# Patient Record
Sex: FEMALE | Race: WHITE | NOT HISPANIC OR LATINO
[De-identification: names, ages, dates, MRNs, and addresses within clinical notes are randomized per-mention and may not be internally consistent; named-entity substitution may affect disease eponyms.]

---

## 2018-05-22 ENCOUNTER — APPOINTMENT (OUTPATIENT)
Dept: ORTHOPEDIC SURGERY | Facility: CLINIC | Age: 83
End: 2018-05-22
Payer: MEDICARE

## 2018-05-22 VITALS — DIASTOLIC BLOOD PRESSURE: 91 MMHG | SYSTOLIC BLOOD PRESSURE: 154 MMHG

## 2018-05-22 VITALS
WEIGHT: 152 LBS | BODY MASS INDEX: 25.95 KG/M2 | HEART RATE: 73 BPM | HEIGHT: 64 IN | DIASTOLIC BLOOD PRESSURE: 94 MMHG | SYSTOLIC BLOOD PRESSURE: 173 MMHG

## 2018-05-22 DIAGNOSIS — Z82.62 FAMILY HISTORY OF OSTEOPOROSIS: ICD-10-CM

## 2018-05-22 DIAGNOSIS — Z82.61 FAMILY HISTORY OF ARTHRITIS: ICD-10-CM

## 2018-05-22 DIAGNOSIS — Z78.9 OTHER SPECIFIED HEALTH STATUS: ICD-10-CM

## 2018-05-22 DIAGNOSIS — Z60.2 PROBLEMS RELATED TO LIVING ALONE: ICD-10-CM

## 2018-05-22 DIAGNOSIS — Z80.9 FAMILY HISTORY OF MALIGNANT NEOPLASM, UNSPECIFIED: ICD-10-CM

## 2018-05-22 DIAGNOSIS — M16.12 UNILATERAL PRIMARY OSTEOARTHRITIS, LEFT HIP: ICD-10-CM

## 2018-05-22 DIAGNOSIS — Z86.39 PERSONAL HISTORY OF OTHER ENDOCRINE, NUTRITIONAL AND METABOLIC DISEASE: ICD-10-CM

## 2018-05-22 PROCEDURE — 73502 X-RAY EXAM HIP UNI 2-3 VIEWS: CPT | Mod: LT

## 2018-05-22 PROCEDURE — 99203 OFFICE O/P NEW LOW 30 MIN: CPT

## 2018-05-22 RX ORDER — APIXABAN 5 MG/1
TABLET, FILM COATED ORAL
Refills: 0 | Status: ACTIVE | COMMUNITY

## 2018-05-22 SDOH — SOCIAL STABILITY - SOCIAL INSECURITY: PROBLEMS RELATED TO LIVING ALONE: Z60.2

## 2019-09-25 ENCOUNTER — APPOINTMENT (OUTPATIENT)
Dept: ORTHOPEDIC SURGERY | Facility: CLINIC | Age: 84
End: 2019-09-25
Payer: MEDICARE

## 2019-09-25 VITALS — HEIGHT: 64 IN | WEIGHT: 152 LBS | BODY MASS INDEX: 25.95 KG/M2

## 2019-09-25 VITALS — WEIGHT: 152 LBS | HEIGHT: 64 IN | BODY MASS INDEX: 25.95 KG/M2

## 2019-09-25 PROCEDURE — 73610 X-RAY EXAM OF ANKLE: CPT | Mod: LT

## 2019-09-25 PROCEDURE — 20610 DRAIN/INJ JOINT/BURSA W/O US: CPT | Mod: RT

## 2019-09-25 PROCEDURE — 99204 OFFICE O/P NEW MOD 45 MIN: CPT | Mod: 25

## 2019-09-25 PROCEDURE — 73562 X-RAY EXAM OF KNEE 3: CPT | Mod: RT

## 2019-10-02 PROBLEM — Z60.2 PERSON LIVING ALONE: Status: ACTIVE | Noted: 2018-05-22

## 2019-11-04 ENCOUNTER — APPOINTMENT (OUTPATIENT)
Dept: ORTHOPEDIC SURGERY | Facility: CLINIC | Age: 84
End: 2019-11-04
Payer: MEDICARE

## 2019-11-04 PROCEDURE — 20611 DRAIN/INJ JOINT/BURSA W/US: CPT | Mod: RT

## 2019-11-04 PROCEDURE — 99214 OFFICE O/P EST MOD 30 MIN: CPT | Mod: 25

## 2019-11-20 ENCOUNTER — APPOINTMENT (OUTPATIENT)
Dept: ORTHOPEDIC SURGERY | Facility: CLINIC | Age: 84
End: 2019-11-20

## 2019-12-16 ENCOUNTER — APPOINTMENT (OUTPATIENT)
Dept: ORTHOPEDIC SURGERY | Facility: CLINIC | Age: 84
End: 2019-12-16
Payer: MEDICARE

## 2019-12-16 VITALS — HEART RATE: 72 BPM | SYSTOLIC BLOOD PRESSURE: 139 MMHG | DIASTOLIC BLOOD PRESSURE: 88 MMHG

## 2019-12-16 VITALS — WEIGHT: 152 LBS | HEIGHT: 64 IN | BODY MASS INDEX: 25.95 KG/M2

## 2019-12-16 PROCEDURE — 99213 OFFICE O/P EST LOW 20 MIN: CPT | Mod: 25

## 2019-12-16 PROCEDURE — 20605 DRAIN/INJ JOINT/BURSA W/O US: CPT | Mod: LT

## 2019-12-16 NOTE — ADDENDUM
[FreeTextEntry1] : I, Natalya Santos, acted solely as a scribe for Dr. Les Mcrae on this date 12/16/2019 \par All medical record entries made by the Scribe were at my, Dr. Les Mcrae, direction and personally dictated by me on 12/16/2019 . I have reviewed the chart and agree that the record accurately reflects my personal performance of the history, physical exam, assessment and plan. I have also personally directed, reviewed, and agreed with the chart.

## 2019-12-16 NOTE — DISCUSSION/SUMMARY
[de-identified] : I discussed the underlying pathophysiology of the patient's condition in great detail with the patient. I went over the patient's x-rays with them in great detail.  I gave the cortisone injection into the patient's left ankle, and she tolerated it well. I told her  to take it easy for the rest of the day, and to continue ROM exercises. The use of ice and rest was also reviewed with the patient. \par \par FU 1 month

## 2019-12-16 NOTE — HISTORY OF PRESENT ILLNESS
[de-identified] : 89 year old female presents with left ankle pain. She had a Durolane injection into her right knee on 11/4/19, which provided her with significant relief. Her right knee does not cause her any pain at this point. She states that she notices her left ankle becomes swollen, and hurts especially after prolonged activity. She localizes this pain to the lateral aspect of her left ankle. 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

## 2019-12-16 NOTE — PHYSICAL EXAM
[de-identified] : Constitutional\par o Appearance : well-nourished, well developed, alert, in no acute distress \par Head and Face\par o Head :\par ¦ Inspection : atraumatic, normocephalic\par o Face :\par ¦ Inspection : no visible rash or discoloration\par Lymphatic\par o Epitrochlear Nodes : no lymphadenopathy \par o Popliteal Nodes : no palpable nodes present \par o Supraclavicular Nodes : no supraclavicular nodes present \par o Preauricular Nodes : no preauricular nodes present \par o Additional Nodes : no additional adenopathy present Skin and Subcutaneous Tissue \par o Head and Neck :\par ¦ Face : no facial lesions\par Neurologic\par o Mental Status Examination :\par ¦ Orientation : alert and oriented X 3\par o Coordination : finger-to-nose-to-finger testing normal bilaterally, heel-shin cerebellar test within normal limits bilaterally \par Psychiatric\par o Mood and Affect: mood normal, affect appropriate \par \par Right Lower Extremity\par o Right Knee :\par ¦ Inspection/Palpation : no medial compartment or lateral compartment tenderness, swelling, or deformities, no warmth\par ¦ Range of Motion : FROM, no crepitance\par ¦ Stability : no valgus or varus instability present on provocative testing\par ¦ Strength : flexion and extension 5/5\par ¦ Tests and Signs : negative Anterior Drawer, negative Lachman, negative Julianna \par \par o Right Ankle :\par ¦ Inspection/Palpation : no tenderness to palpation, no swelling    \par ¦ Range of Motion : arc of motion full and painless in all planes\par ¦ Stability : no joint instability en provocative testing\par ¦ Strength : all muscles 5/5\par o Skin : no erythema or ecchymosis present\par o Sensation : sensation to pin intact\par Tests and Signs : Salvador's test negative\par \par o Right Foot:\par ¦ Inspection/Palpation : no tenderness to palpation, no swelling\par ¦ Range of Motion : arc of motion full and painless in all planes\par ¦ Stability : no joint instability on provocative testing\par ¦ Strength : all muscles 5/5\par o Skin : no erythema or ecchymosis present\par \par Left Lower Extremity \par o Left Knee :\par ¦ Inspection/Palpation : no tenderness to palpation, no swelling\par ¦ Range of Motion : active flexion and extension full and painless, no crepitance\par ¦ Stability : no valgus or varus instability present on provocative testing\par ¦ Strength : flexion and extension 5/5\par ¦ Tests and Signs : negative Anterior Drawer, negative Lachman, negative Julianna \par \par o Left Ankle :\par ¦ Inspection/Palpation : medial and lateral clear space tenderness, subtalar joint tenderness\par ¦ Range of Motion : limited eversion and inversion\par ¦ Stability : no joint instability en provocative testing\par ¦ Strength : all muscles 5/5\par o Skin : no erythema or ecchymosis present\par o Sensation : sensation to pin intact\par Tests and Signs : Salvador's test negative\par \par o Left Foot:\par ¦ Inspection/Palpation : no tenderness, severe planovalgus deformity.\par ¦ Range of Motion : arc of motion full and painless in all planes\par ¦ Stability : no joint instability on provocative testing\par ¦ Strength : all muscles 5/5\par o Skin : no erythema or ecchymosis present\par \par Dorsalis Pedis / Posterior Tibialis Pulse Bilaterally: 2+\par \par Gait and Station:\par Gait: Slow shuffling gait, no significant extremity swelling or lymphedema, poor proprioception and balance, planovalgus deformity of the left foot\par \par o Ankle injection : Indication- ankle osteoarthritis, Anatomic location- left ankle joint space, Spray - area was sterilized with Betadine and alcohol and anesthetized with Ethyl Chloride , needle used-20G, Medications given- 5cc's lidocaine, 0.5cc's kenalog, 0.5 cc's dexamethasone, and patient tolerated it well.

## 2020-01-29 ENCOUNTER — APPOINTMENT (OUTPATIENT)
Dept: ORTHOPEDIC SURGERY | Facility: CLINIC | Age: 85
End: 2020-01-29
Payer: MEDICARE

## 2020-01-29 PROCEDURE — 99214 OFFICE O/P EST MOD 30 MIN: CPT

## 2020-05-29 ENCOUNTER — APPOINTMENT (OUTPATIENT)
Dept: ORTHOPEDIC SURGERY | Facility: CLINIC | Age: 85
End: 2020-05-29
Payer: MEDICARE

## 2020-05-29 ENCOUNTER — APPOINTMENT (OUTPATIENT)
Dept: ORTHOPEDIC SURGERY | Facility: CLINIC | Age: 85
End: 2020-05-29

## 2020-05-29 VITALS — HEIGHT: 64 IN | BODY MASS INDEX: 25.95 KG/M2 | WEIGHT: 152 LBS

## 2020-05-29 DIAGNOSIS — M17.11 UNILATERAL PRIMARY OSTEOARTHRITIS, RIGHT KNEE: ICD-10-CM

## 2020-05-29 DIAGNOSIS — Q66.6 OTHER CONGENITAL VALGUS DEFORMITIES OF FEET: ICD-10-CM

## 2020-05-29 PROCEDURE — 20605 DRAIN/INJ JOINT/BURSA W/O US: CPT | Mod: LT

## 2020-05-29 PROCEDURE — 99214 OFFICE O/P EST MOD 30 MIN: CPT | Mod: 25

## 2020-05-29 PROCEDURE — 73610 X-RAY EXAM OF ANKLE: CPT | Mod: LT

## 2020-05-29 NOTE — ADDENDUM
[FreeTextEntry1] : I, Moris Moeller , acted solely as a scribe for Dr. Les Mcrae on this date 05/29/2020.\par All medical record entries made by the Scribe were at my, Dr. Les Mcrae, direction and personally dictated by me on 05/29/2020. I have reviewed the chart and agree that the record accurately reflects my personal performance of the history, physical exam, assessment and plan. I have also personally directed, reviewed, and agreed with the chart.

## 2020-05-29 NOTE — HISTORY OF PRESENT ILLNESS
[de-identified] : 89 year old female presents with left ankle pain. She had a Durolane injection into her right knee on 11/4/19, which provided her with significant relief. Her right knee does not cause her any pain at this point. She states that she notices her left ankle becomes swollen, and hurts especially after prolonged activity. She localizes this pain to the lateral aspect of her left ankle. She received a cortisone injection into the left ankle on 12/16/2020 and reports moderate relief. She is aware that she has significant arthritis in her ankle. She was prescribed orthotics and has been wearing them. She notes no pain in her right knee today. She is present for a left ankle cortisone injection today 05/29/2020. Pmhx: She is on Eliquis.  She has not gotten her orthotics.  Her knee is great at this point and is not a problem.

## 2020-05-29 NOTE — PHYSICAL EXAM
[de-identified] : Constitutional\par o Appearance : well-nourished, well developed, alert, in no acute distress \par Head and Face\par o Head :\par ¦ Inspection : atraumatic, normocephalic\par o Face :\par ¦ Inspection : no visible rash or discoloration\par Lymphatic\par o Epitrochlear Nodes : no lymphadenopathy \par o Popliteal Nodes : no palpable nodes present \par o Supraclavicular Nodes : no supraclavicular nodes present \par o Preauricular Nodes : no preauricular nodes present \par o Additional Nodes : no additional adenopathy present Skin and Subcutaneous Tissue \par o Head and Neck :\par ¦ Face : no facial lesions\par Neurologic\par o Mental Status Examination :\par ¦ Orientation : alert and oriented X 3\par o Coordination : finger-to-nose-to-finger testing normal bilaterally, heel-shin cerebellar test within normal limits bilaterally \par Psychiatric\par o Mood and Affect: mood normal, affect appropriate \par \par Right Lower Extremity\par o Right Ankle :\par ¦ Inspection/Palpation : no tenderness to palpation, no swelling    \par ¦ Range of Motion : arc of motion full and painless in all planes\par ¦ Stability : no joint instability en provocative testing\par ¦ Strength : all muscles 5/5\par o Skin : no erythema or ecchymosis present\par o Sensation : sensation to pin intact\par Tests and Signs : Salvador's test negative\par \par o Right Foot:\par ¦ Inspection/Palpation : no tenderness to palpation, no swelling\par ¦ Range of Motion : arc of motion full and painless in all planes\par ¦ Stability : no joint instability on provocative testing\par ¦ Strength : all muscles 5/5\par o Skin : no erythema or ecchymosis present\par \par Left Lower Extremity \par o Left Ankle :\par ¦ Inspection/Palpation : posterior tibial  tenderness,  peroneal tenderness, medial clear space tenderness. Most sensitive over the Peroneal tenderness. \par ¦ Range of Motion : limited plantar flexion, and eversion, \par ¦ Stability : no joint instability en provocative testing\par ¦ Strength : all muscles 4/5\par o Skin : no erythema or ecchymosis present\par o Sensation : sensation to pin intact\par Tests and Signs : Salvador's test negative\par \par o Left Foot:\par ¦ Inspection/Palpation : peroneal  tenderness, severe planovalgus deformity.\par ¦ Range of Motion : arc of motion full and painless in all planes\par ¦ Stability : no joint instability on provocative testing\par ¦ Strength : all muscles 4/5\par o Skin : no erythema or ecchymosis present\par \par Dorsalis Pedis / Posterior Tibialis Pulse Bilaterally: 2+\par \par Gait and Station:\par Gait: Slow shuffling gait, no significant extremity swelling or lymphedema, poor proprioception and balance, planovalgus deformity of the left foot, too many toes sign. \par \par Radiology Results \par o Left Ankle : AP, lateral and mortise views were obtained and reveal moderate arthritis of the left ankle, with mid foot arthritis, and significant subtalar arthritis. \par \par o Left Ankle injection : Indication- ankle osteoarthritis, Anatomic location- left peroneal tendons, Spray - area was sterilized with Betadine and alcohol and anesthetized with Ethyl Chloride , needle used-25g, Medications given- 1 cc's lidocaine, 0.5cc's kenalog, 0.5 cc's dexamethasone, and patient tolerated it well.  [id number]

## 2020-05-29 NOTE — PHYSICAL EXAM
[de-identified] : Constitutional\par o Appearance : well-nourished, well developed, alert, in no acute distress \par Head and Face\par o Head :\par ¦ Inspection : atraumatic, normocephalic\par o Face :\par ¦ Inspection : no visible rash or discoloration\par Lymphatic\par o Epitrochlear Nodes : no lymphadenopathy \par o Popliteal Nodes : no palpable nodes present \par o Supraclavicular Nodes : no supraclavicular nodes present \par o Preauricular Nodes : no preauricular nodes present \par o Additional Nodes : no additional adenopathy present Skin and Subcutaneous Tissue \par o Head and Neck :\par ¦ Face : no facial lesions\par Neurologic\par o Mental Status Examination :\par ¦ Orientation : alert and oriented X 3\par o Coordination : finger-to-nose-to-finger testing normal bilaterally, heel-shin cerebellar test within normal limits bilaterally \par Psychiatric\par o Mood and Affect: mood normal, affect appropriate \par \par Right Lower Extremity\par o Right Knee :\par ¦ Inspection/Palpation : mild medial compartment tenderness, no swelling, or deformities, no warmth\par ¦ Range of Motion : FROM, no crepitance\par ¦ Stability : no valgus or varus instability present on provocative testing\par ¦ Strength : flexion and extension 4+/5\par ¦ Tests and Signs : negative Anterior Drawer, negative Lachman, negative Julianna \par \par o Right Ankle :\par ¦ Inspection/Palpation : no tenderness to palpation, no swelling    \par ¦ Range of Motion : arc of motion full and painless in all planes\par ¦ Stability : no joint instability en provocative testing\par ¦ Strength : all muscles 5/5\par o Skin : no erythema or ecchymosis present\par o Sensation : sensation to pin intact\par Tests and Signs : Salvador's test negative\par \par o Right Foot:\par ¦ Inspection/Palpation : no tenderness to palpation, no swelling\par ¦ Range of Motion : arc of motion full and painless in all planes\par ¦ Stability : no joint instability on provocative testing\par ¦ Strength : all muscles 5/5\par o Skin : no erythema or ecchymosis present\par \par Left Lower Extremity \par o Left Knee :\par ¦ Inspection/Palpation : no tenderness to palpation, no swelling\par ¦ Range of Motion : active flexion and extension full and painless, no crepitance\par ¦ Stability : no valgus or varus instability present on provocative testing\par ¦ Strength : flexion and extension 4/5\par ¦ Tests and Signs : negative Anterior Drawer, negative Lachman, negative Julianna \par \par o Left Ankle :\par ¦ Inspection/Palpation : lateral clear space tenderness,  peroneal tenderness \par ¦ Range of Motion : Limited inversion / eversion\par ¦ Stability : no joint instability en provocative testing\par ¦ Strength : all muscles 4/5\par o Skin : no erythema or ecchymosis present\par o Sensation : sensation to pin intact\par Tests and Signs : Salvador's test negative\par \par o Left Foot:\par ¦ Inspection/Palpation : peroneal  tenderness, severe planovalgus deformity.\par ¦ Range of Motion : arc of motion full and painless in all planes\par ¦ Stability : no joint instability on provocative testing\par ¦ Strength : all muscles 4/5\par o Skin : no erythema or ecchymosis present\par \par Dorsalis Pedis / Posterior Tibialis Pulse Bilaterally: 2+\par \par Gait and Station:\par Gait: Slow shuffling gait, no significant extremity swelling or lymphedema, poor proprioception and balance, planovalgus deformity of the left foot

## 2020-05-29 NOTE — DISCUSSION/SUMMARY
[de-identified] : I went over the pathophysiology of the patient's symptoms in great detail and used models to aid in my explanation.

## 2020-05-29 NOTE — DISCUSSION/SUMMARY
[de-identified] : I discussed the underlying pathophysiology of the patient's condition in great detail with the patient. I went over the patient's x-rays with them in great detail. The extent of the patient’s arthritis was discussed in great detail with them.  We discussed the use of ice, Tylenol relieve pain. The patient was instructed in ROM exercises they are to do at home. At-home strengthening, and stretching exercises were discussed and demonstrated with the patient. We went over the wide ranging benefits of exercise for the patient health and I encouraged her to begin a diligent exercise program. She needs to avoid high-impact activities such as running and jumping. I recommend alternative activities such as riding a stationary bike on low tension, or the elliptical. She should focus on light weight and high repetition exercises. She  should avoid squatting, and kneeling.  I discussed that the patient should get orthotics for her shoes. A prescription for orthotics was provided to the patient.  I gave the cortisone injection into the patient's left ankle, and she tolerated it well. I told her  to take it easy for the rest of the day, and to continue ROM exercises. The use of ice and rest was also reviewed with the patient.  At this time, she will start a course of physical therapy for strengthening and flexibility. A prescription for physical therapy was provided. All of her questions were answered. She understands and consents to the plan. \par \par FU 1 month\par after undergoing a left ankle cortisone injection today 05/29/2020. \par after wearing orthotics. \par after doing PT.

## 2020-05-29 NOTE — HISTORY OF PRESENT ILLNESS
[de-identified] : 89  year old female presents with left ankle pain. She had a Durolane injection into her right knee on 11/4/19, which provided her with significant relief. Her right knee does not cause her any pain at this point. She states that she notices her left ankle becomes swollen, and hurts especially after prolonged activity. She localizes this pain to the lateral aspect of her left ankle. She received a cortisone injection into the left ankle on 12/16/2020 and reports moderate relief. She is aware that she has significant arthritis in her ankle. 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

## 2020-06-25 ENCOUNTER — APPOINTMENT (OUTPATIENT)
Dept: ORTHOPEDIC SURGERY | Facility: CLINIC | Age: 85
End: 2020-06-25

## 2021-01-19 ENCOUNTER — APPOINTMENT (OUTPATIENT)
Dept: PULMONOLOGY | Facility: CLINIC | Age: 86
End: 2021-01-19
Payer: MEDICARE

## 2021-01-19 ENCOUNTER — FORM ENCOUNTER (OUTPATIENT)
Age: 86
End: 2021-01-19

## 2021-01-19 DIAGNOSIS — U07.1 COVID-19: ICD-10-CM

## 2021-01-19 PROCEDURE — 99443: CPT | Mod: CS,95

## 2021-01-19 NOTE — HISTORY OF PRESENT ILLNESS
[Home] : at home, [unfilled] , at the time of the visit. [Medical Office: ([facility])___] : at the medical office located in  [Verbal consent obtained from patient] : the patient, [unfilled] [FreeTextEntry1] : Initial Telephonic visit - COVID. \par [facility] Referral. Pt of Dr. lCeaning\par \par 89 year old woman. Independent. Lives at [facility], alone\par \par h/o AF - on Eliquis.\par h/o bronchitis that develops with URI\par \par Sx developed 1/14.\par Initially mild. cough, "cold" sx\par Went to ED at [facility] 1/17 - tested positive for COVID, and tx with MAB. Released\par CXR there negative\par last 2 days, fatigue, in bed, sleeping.\par Today -- feeling a bit stronger.\par Still sleeping a lot\par Decreased appetite . but is eating some. Taking in fluids\par Has a pulse ox - 99%.\par Doesnt have a thermometer.\par \par Dr. Cleaning sent script for pulmicort -- pharmacy to send up to her.\par \par on the phone, sounds tired but no distress\par \par -ordering [facility]\par -will followclosely

## 2021-01-20 ENCOUNTER — APPOINTMENT (OUTPATIENT)
Dept: PULMONOLOGY | Facility: CLINIC | Age: 86
End: 2021-01-20

## 2021-01-20 ENCOUNTER — LABORATORY RESULT (OUTPATIENT)
Age: 86
End: 2021-01-20

## 2021-01-20 ENCOUNTER — APPOINTMENT (OUTPATIENT)
Dept: PULMONOLOGY | Facility: CLINIC | Age: 86
End: 2021-01-20
Payer: MEDICARE

## 2021-01-20 ENCOUNTER — NON-APPOINTMENT (OUTPATIENT)
Age: 86
End: 2021-01-20

## 2021-01-20 LAB
ALBUMIN SERPL ELPH-MCNC: 4 G/DL
ALP BLD-CCNC: 44 U/L
ALT SERPL-CCNC: 15 U/L
ANION GAP SERPL CALC-SCNC: 14 MMOL/L
AST SERPL-CCNC: 22 U/L
BASOPHILS # BLD AUTO: 0.01 K/UL
BASOPHILS NFR BLD AUTO: 0.3 %
BILIRUB SERPL-MCNC: 0.6 MG/DL
BUN SERPL-MCNC: 15 MG/DL
CALCIUM SERPL-MCNC: 9.9 MG/DL
CHLORIDE SERPL-SCNC: 102 MMOL/L
CO2 SERPL-SCNC: 23 MMOL/L
CREAT SERPL-MCNC: 0.85 MG/DL
CRP SERPL-MCNC: 0.44 MG/DL
DEPRECATED D DIMER PPP IA-ACNC: 157 NG/ML DDU
EOSINOPHIL # BLD AUTO: 0.04 K/UL
EOSINOPHIL NFR BLD AUTO: 1.4 %
FERRITIN SERPL-MCNC: 135 NG/ML
GLUCOSE SERPL-MCNC: 103 MG/DL
HCT VFR BLD CALC: 39 %
HGB BLD-MCNC: 12.5 G/DL
IMM GRANULOCYTES NFR BLD AUTO: 0.3 %
LYMPHOCYTES # BLD AUTO: 1.21 K/UL
LYMPHOCYTES NFR BLD AUTO: 42 %
MAN DIFF?: NORMAL
MCHC RBC-ENTMCNC: 32.1 GM/DL
MCHC RBC-ENTMCNC: 32.3 PG
MCV RBC AUTO: 100.8 FL
MONOCYTES # BLD AUTO: 0.41 K/UL
MONOCYTES NFR BLD AUTO: 14.2 %
NEUTROPHILS # BLD AUTO: 1.2 K/UL
NEUTROPHILS NFR BLD AUTO: 41.8 %
PLATELET # BLD AUTO: 139 K/UL
POTASSIUM SERPL-SCNC: 4.9 MMOL/L
PROCALCITONIN SERPL-MCNC: 0.07 NG/ML
PROT SERPL-MCNC: 6.5 G/DL
RBC # BLD: 3.87 M/UL
RBC # FLD: 13.3 %
SODIUM SERPL-SCNC: 139 MMOL/L
WBC # FLD AUTO: 2.88 K/UL

## 2021-01-20 PROCEDURE — 99441: CPT | Mod: CS,95

## 2021-01-20 NOTE — HISTORY OF PRESENT ILLNESS
[Home] : at home, [unfilled] , at the time of the visit. [Verbal consent obtained from patient] : the patient, [unfilled] [Medical Office: ([facility])___] : at the medical office located in  [FreeTextEntry1] : Telephonic follow-up with the patient.  I also spoke with the visiting nurse who saw her in her home today.\par \par Afebrile, pulse ox is 98% on room air.  Still with some cough, but no shortness of breath.  Appetite is continues lungs were clear. \par \par Taking pulmicort prn robitussin\par \par RN to follow up as well\par \par labs ok\par \par d/w dr jeronimo

## 2021-10-05 ENCOUNTER — APPOINTMENT (OUTPATIENT)
Dept: ORTHOPEDIC SURGERY | Facility: CLINIC | Age: 86
End: 2021-10-05
Payer: MEDICARE

## 2021-10-05 VITALS
SYSTOLIC BLOOD PRESSURE: 165 MMHG | HEART RATE: 88 BPM | BODY MASS INDEX: 25.95 KG/M2 | HEIGHT: 64 IN | DIASTOLIC BLOOD PRESSURE: 87 MMHG | WEIGHT: 152 LBS

## 2021-10-05 DIAGNOSIS — Z87.891 PERSONAL HISTORY OF NICOTINE DEPENDENCE: ICD-10-CM

## 2021-10-05 DIAGNOSIS — M54.50 LOW BACK PAIN, UNSPECIFIED: ICD-10-CM

## 2021-10-05 DIAGNOSIS — M47.816 SPONDYLOSIS W/OUT MYELOPATHY OR RADICULOPATHY, LUMBAR REGION: ICD-10-CM

## 2021-10-05 PROCEDURE — 72100 X-RAY EXAM L-S SPINE 2/3 VWS: CPT

## 2021-10-05 PROCEDURE — 99214 OFFICE O/P EST MOD 30 MIN: CPT

## 2021-10-05 NOTE — DISCUSSION/SUMMARY
[Medication Risks Reviewed] : Medication risks reviewed [de-identified] : She will rest and use moist heat.  She will push Tylenol to the full dose.  She has been given written instructions regarding that.  She will call if the symptoms worsen and I will see her for follow-up in 2 to 3 weeks on a as needed basis.

## 2021-10-05 NOTE — HISTORY OF PRESENT ILLNESS
[de-identified] : 71-year-old has a long history of intermittent back problems.  Yesterday she had the sudden acute onset of right-sided lower back pain that extends to high in the right buttock.  She has not had leg pain or neurologic symptoms.  There is no Valsalva effect.  There is no history of injury.  Surprisingly today she already feels 50 or 60% better.\par \par She has had prior surgery for endometrial cancer and breast cancer.  She has had a prior hip replacement and ankle surgery.  She is on medication for hypertension and hyperlipidemia.  She is also on Eliquis but cannot tell me why she is on the Eliquis.  She denies a history of atrial fibrillation, TIA, a stroke or thromboembolic disease. [Pain Location] : pain [5] : a maximum pain level of 5/10

## 2021-10-05 NOTE — PHYSICAL EXAM
[de-identified] : She seems fully alert and oriented with a normal mood and affect.  She cannot accurately delineate all of her medical history.  She has some difficulty arising from a chair due to right-sided lower back pain.  She ambulates with a slow gait.  She has a mildly increased kyphosis and a mild scoliosis.  There are no cutaneous abnormalities or palpable bony defects of the spine.  There is no evidence of shortness of breath or respiratory distress.  There is no paravertebral muscle spasm but sidebending is limited.  There is mild right-sided sciatic notch sensitivity but none on the left.  There is no trochanteric tenderness.  Forward flexion of the spine at 40 or 50 degrees causes mild right-sided lower back pain.  Her lower extremity neurological examination revealed 1+ symmetrical reflexes.  Motor power is normal to manual testing in all lower extremity groups and sensation is normal to light touch in all dermatomes.  Straight leg raising is negative to 90 degrees in the sitting position.  Vascular examination reveals superficial varicosities.  There is no lymphedema.  Her upper extremities are normal to inspection and her elbows have a full and painless range of motion with normal motor power normal stability. [de-identified] : AP and lateral x-rays of the lumbar spine reveal mild scoliosis.  There is significant multilevel degenerative changes.  Sagittal alignment is normal.  There is an old appearing superior endplate deformity of L2.  There are no destructive changes.

## 2021-10-09 ENCOUNTER — APPOINTMENT (OUTPATIENT)
Dept: OBGYN | Facility: CLINIC | Age: 86
End: 2021-10-09

## 2022-01-03 ENCOUNTER — EMERGENCY (EMERGENCY)
Facility: HOSPITAL | Age: 87
LOS: 1 days | Discharge: HOME | End: 2022-01-03
Attending: EMERGENCY MEDICINE | Admitting: EMERGENCY MEDICINE
Payer: MEDICARE

## 2022-01-03 VITALS
SYSTOLIC BLOOD PRESSURE: 172 MMHG | DIASTOLIC BLOOD PRESSURE: 86 MMHG | HEART RATE: 83 BPM | RESPIRATION RATE: 20 BRPM | HEIGHT: 64 IN | OXYGEN SATURATION: 97 % | TEMPERATURE: 98 F

## 2022-01-03 LAB
ALBUMIN SERPL ELPH-MCNC: 4.6 G/DL — SIGNIFICANT CHANGE UP (ref 3.3–5)
ALP SERPL-CCNC: 58 U/L — SIGNIFICANT CHANGE UP (ref 40–120)
ALT FLD-CCNC: 14 U/L — SIGNIFICANT CHANGE UP (ref 4–33)
ANION GAP SERPL CALC-SCNC: 13 MMOL/L — SIGNIFICANT CHANGE UP (ref 7–14)
APTT BLD: 33.6 SEC — SIGNIFICANT CHANGE UP (ref 27–36.3)
AST SERPL-CCNC: 17 U/L — SIGNIFICANT CHANGE UP (ref 4–32)
B PERT DNA SPEC QL NAA+PROBE: SIGNIFICANT CHANGE UP
B PERT+PARAPERT DNA PNL SPEC NAA+PROBE: SIGNIFICANT CHANGE UP
BASOPHILS # BLD AUTO: 0.01 K/UL — SIGNIFICANT CHANGE UP (ref 0–0.2)
BASOPHILS NFR BLD AUTO: 0.1 % — SIGNIFICANT CHANGE UP (ref 0–2)
BILIRUB SERPL-MCNC: 1.7 MG/DL — HIGH (ref 0.2–1.2)
BLD GP AB SCN SERPL QL: NEGATIVE — SIGNIFICANT CHANGE UP
BLOOD GAS VENOUS COMPREHENSIVE RESULT: SIGNIFICANT CHANGE UP
BORDETELLA PARAPERTUSSIS (RAPRVP): SIGNIFICANT CHANGE UP
BUN SERPL-MCNC: 16 MG/DL — SIGNIFICANT CHANGE UP (ref 7–23)
C PNEUM DNA SPEC QL NAA+PROBE: SIGNIFICANT CHANGE UP
CALCIUM SERPL-MCNC: 10.2 MG/DL — SIGNIFICANT CHANGE UP (ref 8.4–10.5)
CHLORIDE SERPL-SCNC: 100 MMOL/L — SIGNIFICANT CHANGE UP (ref 98–107)
CO2 SERPL-SCNC: 25 MMOL/L — SIGNIFICANT CHANGE UP (ref 22–31)
CREAT SERPL-MCNC: 0.66 MG/DL — SIGNIFICANT CHANGE UP (ref 0.5–1.3)
EOSINOPHIL # BLD AUTO: 0 K/UL — SIGNIFICANT CHANGE UP (ref 0–0.5)
EOSINOPHIL NFR BLD AUTO: 0 % — SIGNIFICANT CHANGE UP (ref 0–6)
FLUAV SUBTYP SPEC NAA+PROBE: SIGNIFICANT CHANGE UP
FLUBV RNA SPEC QL NAA+PROBE: SIGNIFICANT CHANGE UP
GLUCOSE SERPL-MCNC: 175 MG/DL — HIGH (ref 70–99)
HADV DNA SPEC QL NAA+PROBE: SIGNIFICANT CHANGE UP
HCOV 229E RNA SPEC QL NAA+PROBE: SIGNIFICANT CHANGE UP
HCOV HKU1 RNA SPEC QL NAA+PROBE: SIGNIFICANT CHANGE UP
HCOV NL63 RNA SPEC QL NAA+PROBE: SIGNIFICANT CHANGE UP
HCOV OC43 RNA SPEC QL NAA+PROBE: SIGNIFICANT CHANGE UP
HCT VFR BLD CALC: 41.9 % — SIGNIFICANT CHANGE UP (ref 34.5–45)
HGB BLD-MCNC: 13.4 G/DL — SIGNIFICANT CHANGE UP (ref 11.5–15.5)
HMPV RNA SPEC QL NAA+PROBE: SIGNIFICANT CHANGE UP
HPIV1 RNA SPEC QL NAA+PROBE: SIGNIFICANT CHANGE UP
HPIV2 RNA SPEC QL NAA+PROBE: SIGNIFICANT CHANGE UP
HPIV3 RNA SPEC QL NAA+PROBE: SIGNIFICANT CHANGE UP
HPIV4 RNA SPEC QL NAA+PROBE: SIGNIFICANT CHANGE UP
IANC: 6.31 K/UL — SIGNIFICANT CHANGE UP (ref 1.5–8.5)
IMM GRANULOCYTES NFR BLD AUTO: 0.3 % — SIGNIFICANT CHANGE UP (ref 0–1.5)
INR BLD: 1.72 RATIO — HIGH (ref 0.88–1.16)
LIDOCAIN IGE QN: 29 U/L — SIGNIFICANT CHANGE UP (ref 7–60)
LYMPHOCYTES # BLD AUTO: 0.55 K/UL — LOW (ref 1–3.3)
LYMPHOCYTES # BLD AUTO: 7.6 % — LOW (ref 13–44)
M PNEUMO DNA SPEC QL NAA+PROBE: SIGNIFICANT CHANGE UP
MCHC RBC-ENTMCNC: 32 GM/DL — SIGNIFICANT CHANGE UP (ref 32–36)
MCHC RBC-ENTMCNC: 32.2 PG — SIGNIFICANT CHANGE UP (ref 27–34)
MCV RBC AUTO: 100.7 FL — HIGH (ref 80–100)
MONOCYTES # BLD AUTO: 0.33 K/UL — SIGNIFICANT CHANGE UP (ref 0–0.9)
MONOCYTES NFR BLD AUTO: 4.6 % — SIGNIFICANT CHANGE UP (ref 2–14)
NEUTROPHILS # BLD AUTO: 6.31 K/UL — SIGNIFICANT CHANGE UP (ref 1.8–7.4)
NEUTROPHILS NFR BLD AUTO: 87.4 % — HIGH (ref 43–77)
NRBC # BLD: 0 /100 WBCS — SIGNIFICANT CHANGE UP
NRBC # FLD: 0 K/UL — SIGNIFICANT CHANGE UP
PLATELET # BLD AUTO: 214 K/UL — SIGNIFICANT CHANGE UP (ref 150–400)
POTASSIUM SERPL-MCNC: 3.9 MMOL/L — SIGNIFICANT CHANGE UP (ref 3.5–5.3)
POTASSIUM SERPL-SCNC: 3.9 MMOL/L — SIGNIFICANT CHANGE UP (ref 3.5–5.3)
PROT SERPL-MCNC: 7.7 G/DL — SIGNIFICANT CHANGE UP (ref 6–8.3)
PROTHROM AB SERPL-ACNC: 19.1 SEC — HIGH (ref 10.6–13.6)
RAPID RVP RESULT: SIGNIFICANT CHANGE UP
RBC # BLD: 4.16 M/UL — SIGNIFICANT CHANGE UP (ref 3.8–5.2)
RBC # FLD: 14.6 % — HIGH (ref 10.3–14.5)
RH IG SCN BLD-IMP: POSITIVE — SIGNIFICANT CHANGE UP
RSV RNA SPEC QL NAA+PROBE: SIGNIFICANT CHANGE UP
RV+EV RNA SPEC QL NAA+PROBE: SIGNIFICANT CHANGE UP
SARS-COV-2 RNA SPEC QL NAA+PROBE: SIGNIFICANT CHANGE UP
SODIUM SERPL-SCNC: 138 MMOL/L — SIGNIFICANT CHANGE UP (ref 135–145)
TROPONIN T, HIGH SENSITIVITY RESULT: 9 NG/L — SIGNIFICANT CHANGE UP
WBC # BLD: 7.22 K/UL — SIGNIFICANT CHANGE UP (ref 3.8–10.5)
WBC # FLD AUTO: 7.22 K/UL — SIGNIFICANT CHANGE UP (ref 3.8–10.5)

## 2022-01-03 PROCEDURE — 71260 CT THORAX DX C+: CPT | Mod: 26,MA

## 2022-01-03 PROCEDURE — 71045 X-RAY EXAM CHEST 1 VIEW: CPT | Mod: 26

## 2022-01-03 PROCEDURE — 74177 CT ABD & PELVIS W/CONTRAST: CPT | Mod: 26,MA

## 2022-01-03 PROCEDURE — 99285 EMERGENCY DEPT VISIT HI MDM: CPT | Mod: 25

## 2022-01-03 PROCEDURE — 93010 ELECTROCARDIOGRAM REPORT: CPT

## 2022-01-03 RX ORDER — SODIUM CHLORIDE 9 MG/ML
1000 INJECTION INTRAMUSCULAR; INTRAVENOUS; SUBCUTANEOUS ONCE
Refills: 0 | Status: COMPLETED | OUTPATIENT
Start: 2022-01-03 | End: 2022-01-03

## 2022-01-03 RX ORDER — ONDANSETRON 8 MG/1
4 TABLET, FILM COATED ORAL ONCE
Refills: 0 | Status: COMPLETED | OUTPATIENT
Start: 2022-01-03 | End: 2022-01-03

## 2022-01-03 RX ORDER — MORPHINE SULFATE 50 MG/1
4 CAPSULE, EXTENDED RELEASE ORAL ONCE
Refills: 0 | Status: DISCONTINUED | OUTPATIENT
Start: 2022-01-03 | End: 2022-01-03

## 2022-01-03 RX ORDER — ASPIRIN/CALCIUM CARB/MAGNESIUM 324 MG
324 TABLET ORAL ONCE
Refills: 0 | Status: DISCONTINUED | OUTPATIENT
Start: 2022-01-03 | End: 2022-01-03

## 2022-01-03 RX ADMIN — MORPHINE SULFATE 4 MILLIGRAM(S): 50 CAPSULE, EXTENDED RELEASE ORAL at 19:06

## 2022-01-03 RX ADMIN — SODIUM CHLORIDE 1000 MILLILITER(S): 9 INJECTION INTRAMUSCULAR; INTRAVENOUS; SUBCUTANEOUS at 21:14

## 2022-01-03 RX ADMIN — ONDANSETRON 4 MILLIGRAM(S): 8 TABLET, FILM COATED ORAL at 19:06

## 2022-01-03 NOTE — ED PROVIDER NOTE - NSFOLLOWUPINSTRUCTIONS_ED_ALL_ED_FT
(1) Follow up with your primary care physician as discussed. In addition, we did not find evidence of a life threatening illness on your testing here today, but listed below are the specialists that will be necessary to see as an outpatient to continue the workup.  Please call the numbers listed below or [phone] to set up the necessary appointments.  (2) Immediately seek care at your nearest emergency room if your symptoms worsen, persist, or do not resolve   (3) Take Tylenol anrin as needed for pain per the dosing instructions on the bottle. (1) Follow up with your primary care physician as discussed.   (2) Immediately seek care at your nearest emergency room if your symptoms worsen, persist, or do not resolve   (3) Take Tylenol as needed for pain per the dosing instructions on the bottle.    Bowel Obstruction    WHAT YOU NEED TO KNOW:    A bowel obstruction is a partial or complete blockage of your intestine. Your small or large intestine may be affected. The blockage prevents food and waste from passing through normally.    DISCHARGE INSTRUCTIONS:    Return to the emergency department if:   •You have severe abdominal pain that does not get better.  •Your heart is beating faster than normal for you.  •You have a fever.    Call your doctor if:   •You have nausea and are vomiting.  •Your abdomen is enlarged.  •You cannot pass a bowel movement or gas.  •You lose weight without trying.  •You have blood in your bowel movement.  •You have questions or concerns about your condition or care.

## 2022-01-03 NOTE — ED PROVIDER NOTE - NS ED ROS FT
CONSTITUTIONAL - No fever, No diaphoresis, No weight change  EYES - No eye pain, No blurred vision  ENT - No change in hearing, No sore throat, No neck pain, No rhinorrhea, No ear pain  RESPIRATORY - No shortness of breath, No cough  CARDIAC +chest pain, No palpitations  GI - No abdominal pain, +nausea, +vomiting, No diarrhea, No constipation  - No dysuria, no frequency, no hematuria.   MUSCULOSKELETAL - No joint pain, No swelling, No back pain  NEUROLOGIC - No numbness, No focal weakness, No headache, No dizziness

## 2022-01-03 NOTE — ED PROVIDER NOTE - CLINICAL SUMMARY MEDICAL DECISION MAKING FREE TEXT BOX
Pt is 89 y/o F w/ hx of afib, uterine/breast ca in the past, hysterectomy p/w cp and LUQ abd pain along w/ n/v. DDx acs vs pancreatitis vs obstruction. Ordered labs, meds, imaging. Will reassess. Dispo pending workup.

## 2022-01-03 NOTE — ED PROVIDER NOTE - PROGRESS NOTE DETAILS
O'Teri DO PGY-2: received sign out on this patient. Magalie DO PGY-2: paged surgery O'Teri DO PGY-2: discussed patient with surgery about an hour ago. Who said O'Teri DO PGY-2: late note. discussed patient with surgery about an hour ago. Who said to trial PO. We did and pt passed PO chall Magalie NOBLE PGY-2: Results explained to patient. Explained strict return precautions.

## 2022-01-03 NOTE — ED ADULT NURSE REASSESSMENT NOTE - NS ED NURSE REASSESS COMMENT FT1
report received from IRWIN MELTON pt A&ox3 states chest pain is minimal at this time. a fib on cardiac monitor. IV fluids infusing well, rpt abs after fluids. comfort and safety measures provided. report received from IRWIN MELTON pt A&ox2 states chest pain is minimal at this time. a fib on cardiac monitor. IV fluids infusing well, rpt abs after fluids. comfort and safety measures provided.

## 2022-01-03 NOTE — ED PROVIDER NOTE - ATTENDING CONTRIBUTION TO CARE
DR. BLOCH, ATTENDING MD-  I performed a face to face bedside interview with patient regarding history of present illness, review of symptoms and past medical history. I completed an independent physical exam.  I have discussed patient's plan of care with the resident.  Patient examined, well appearing NAd HEENT nml heart ireg, lungs clear, abd soft mild epigastric tenderness, extrem no edema, pulses intact. neuro nml

## 2022-01-03 NOTE — ED PROVIDER NOTE - OBJECTIVE STATEMENT
89 y/o F w/ hx afib on eliquis, HTN, HLD, hysterectomy 20 yrs ago, hx of uterine/breast CA p/w cp worse on L side and worse when supine. Pt also c/o n/v and LUQ abd pain. Pt denies recent f/c. 89 y/o F w/ hx afib on eliquis, HTN, HLD, hysterectomy 20 yrs ago, hx of uterine/breast CA p/w cp worse on L side and worse when supine. Pt also c/o n/v and LUQ abd pain. Pt denies recent f/c. Denies being able to have BM's today

## 2022-01-03 NOTE — ED ADULT NURSE NOTE - OBJECTIVE STATEMENT
Carlee RN: Patient is a 89-year-old female, A&OX3, ambulatory Phx breast Ca, hypothyroid, hld c/o mid-epigastric pain that started yesterday. Has also been endorsing nausea and vomiting. Appears uncomfortable on exam. Noted to be hypertensive on exam. On Eliquis. Pt unsure why on blood thinner. Breathing is even and unlabored, chest rise equal b/l. 20 G placed in R AC. Labs drawn and sent. Bed set in lowest position. Side rails X 2. Will continue to monitor.

## 2022-01-04 VITALS
DIASTOLIC BLOOD PRESSURE: 98 MMHG | OXYGEN SATURATION: 99 % | RESPIRATION RATE: 16 BRPM | TEMPERATURE: 98 F | HEART RATE: 87 BPM | SYSTOLIC BLOOD PRESSURE: 149 MMHG

## 2022-01-04 LAB — LACTATE BLDV-MCNC: 1.4 MMOL/L — SIGNIFICANT CHANGE UP (ref 0.5–2)

## 2022-01-04 RX ORDER — ONDANSETRON 8 MG/1
1 TABLET, FILM COATED ORAL
Qty: 9 | Refills: 0
Start: 2022-01-04 | End: 2022-01-06

## 2022-01-04 NOTE — CONSULT NOTE ADULT - ASSESSMENT
89 y/o F with imaging concerning for low grade SBO however patient is not clinically obstructed as she continues to pass gas and have bowel movement. She has a benign abdominal exam and denies abdominal pain. No current nausea    -Given patient's clinical picture and absence of abdominal pain would recommend PO challenge  -If patient tolerates PO without issue would recommend outpatient follow-up as needed  -Return precautions given including, nausea, vomiting, increased abdominal pain, fevers, chills  -Discussed with Dr. Burgess

## 2022-01-04 NOTE — ED ADULT NURSE REASSESSMENT NOTE - COMFORT CARE
assisted to bathroom/plan of care explained/repositioned/warm blanket provided
plan of care explained

## 2022-01-04 NOTE — CONSULT NOTE ADULT - SUBJECTIVE AND OBJECTIVE BOX
GENERAL SURGERY CONSULT NOTE  Attending: Dr. Burgess  Service: B Team  Contact: [id number]    HPI  89 y/o F w/ hx afib on eliquis, HTN, HLD, PSH significant for hysterectomy 20 years ago for uterine CA, breast CA s/p mastectomy, and cholecystectomy 12 years ago presented with back pain that began yesterday. She states that she did have some nausea over the last several days, but this has resolved and is no longer experiencing nausea. She had an episode of emesis earlier today, but none since. She denies abdominal pain. She has been passing gas and last had several bowel movements yesterday. No fevers, chills, or SOB.     In ED patient is hemodynamically stable and afebrile. WBC 7. Lactate was initially 2.7 but after fluid resuscitation lactate improved in 1.4. CT scan was obtained which showed some dilated loops of small bowel concerning for low grade SBO.      PMH/PSH  Hypothyroidism    Hypercholesteremia    CA - Breast Cancer      History of Mastectomy    History of Mastectomy    History of Thyroidectomy        MEDICATIONS      Allergies    No Known Allergies    Intolerances        Social    Physical Exam  T(C): 37 (01-04-22 @ 01:01), Max: 37 (01-04-22 @ 01:01)  HR: 72 (01-04-22 @ 01:01) (70 - 92)  BP: 145/88 (01-04-22 @ 01:01) (145/88 - 182/84)  RR: 18 (01-04-22 @ 01:01) (16 - 20)  SpO2: 97% (01-04-22 @ 01:01) (97% - 100%)  Wt(kg): --  Tmax: T(C): , Max: 37 (01-04-22 @ 01:01)  Wt(kg): --      Gen: NAD  Neuro: AAOx3  HEENT: normocephalic, atraumatic, no scleral icterus  CV: S1, S2, RRR  Pulm: CTA B/L  Abd: Soft, ND, NT, no rebound, no guarding, no palpable organomegaly/masses  Ext: warm, no edema    LABS                        13.4   7.22  )-----------( 214      ( 03 Jan 2022 18:11 )             41.9     01-03    138  |  100  |  16  ----------------------------<  175<H>  3.9   |  25  |  0.66    Ca    10.2      03 Jan 2022 18:11    TPro  7.7  /  Alb  4.6  /  TBili  1.7<H>  /  DBili  x   /  AST  17  /  ALT  14  /  AlkPhos  58  01-03    PT/INR - ( 03 Jan 2022 18:11 )   PT: 19.1 sec;   INR: 1.72 ratio         PTT - ( 03 Jan 2022 18:11 )  PTT:33.6 sec          IMAGING  < from: CT Abdomen and Pelvis w/ IV Cont (01.03.22 @ 22:28) >  IMPRESSION:  There are multiple prominently distended/borderline dilated loops of   small bowel with transition in the upper pelvic region. There are   associated mild congestive changes adjacent to these small bowel loops.   Findings suggest a low-grade small bowel obstruction.    1.7 x 1.5 cm hypoattenuating lesion in the right hepatic lobe (2:79).   Finding could represent a hemangioma or other hepatic based neoplasm.   Contrast-enhanced MR can be obtained for further evaluation if clinically   warranted.    Fusiform aneurysmal dilatation of the ascending thoracic aorta, measures   4.4 cm.    --- End of Report ---    < end of copied text >

## 2022-03-16 ENCOUNTER — APPOINTMENT (OUTPATIENT)
Dept: ORTHOPEDIC SURGERY | Facility: CLINIC | Age: 87
End: 2022-03-16
Payer: MEDICARE

## 2022-03-16 DIAGNOSIS — R26.89 OTHER ABNORMALITIES OF GAIT AND MOBILITY: ICD-10-CM

## 2022-03-16 DIAGNOSIS — M76.822 POSTERIOR TIBIAL TENDINITIS, LEFT LEG: ICD-10-CM

## 2022-03-16 PROCEDURE — 99214 OFFICE O/P EST MOD 30 MIN: CPT | Mod: 25

## 2022-03-16 PROCEDURE — 20610 DRAIN/INJ JOINT/BURSA W/O US: CPT | Mod: LT

## 2022-03-16 PROCEDURE — 73564 X-RAY EXAM KNEE 4 OR MORE: CPT | Mod: LT

## 2022-03-16 NOTE — HISTORY OF PRESENT ILLNESS
[de-identified] : 89 year old female presents complaining of left knee pain. She denies an injury. She notes the worst pain when she crosses and uncrosses her legs. She notes her balance is very bad. She is ambulating with a folding cart. Her right knee has not bothered her since her Durolane injection on 11/04/19. She is on Eliquis for AFib. She had prior surgery for endometrial cancer and breast cancer. She had a prior right THR and ankle surgery. She is COVID boosted.

## 2022-03-16 NOTE — PHYSICAL EXAM
[de-identified] : Constitutional\par o Appearance : well-nourished, well developed, alert, in no acute distress \par Head and Face\par o Head :\par ¦ Inspection : atraumatic, normocephalic\par o Face :\par ¦ Inspection : no visible rash or discoloration\par Respiratory\par o Respiratory Effort: breathing unlabored \par Neurologic\par o Mental Status Examination :\par ¦ Orientation : alert and oriented X 3\par Psychiatric\par o Mood and Affect: mood normal, affect appropriate\par Cardiovascular\par o Observation/Palpation : - no swelling\par Lymphatic\par o Additional Nodes : No palpable lymph nodes present\par \par Right Lower Extremity\par o Buttock : no tenderness, swelling or deformities \par o Right Hip :\par ¦ Inspection/Palpation : no tenderness, swelling or deformities\par ¦ Range of Motion : full and painless in all planes, no crepitance\par ¦ Stability : joint stability intact\par ¦ Strength : extension, flexion, adduction, abduction, internal rotation and external rotation, 4+/5\par \par o Right Knee :\par ¦ Inspection/Palpation : no tenderness to palpation, no swelling\par ¦ Range of Motion : active flexion and extension full and painless, no crepitance\par ¦ Stability : no valgus or varus instability present on provocative testing\par ¦ Strength : flexion and extension 4+/5\par ¦ Tests and Signs : negative Anterior Drawer, negative Lachman, negative Julianna\par \par Left Lower Extremity\par o Buttock : no tenderness, swelling or deformities \par o Left Hip :\par ¦ Inspection/Palpation : no tenderness, no swelling or deformities\par ¦ Range of Motion : full and painless in all planes, no crepitance\par ¦ Stability : joint stability intact\par ¦ Strength : extension, flexion, adduction, abduction, internal rotation and external rotation, 4+/5\par \par o Left Knee :\par ¦ Inspection/Palpation : significant medial and lateral compartment tenderness to palpation, no swelling\par ¦ Range of Motion : 0-120° with pain , no crepitance\par ¦ Stability : no valgus or varus instability present on provocative testing\par ¦ Strength : flexion and extension 4+/5\par ¦ Tests and Signs : negative Anterior Drawer, negative Lachman, negative Julianna\par \par o Peripheral Vascular System :\par ¦ Dorsalis Pedis Artery : pulse 2+ bilaterally\par ¦ Posterior Tibial Artery : pulse 2+ bilaterally \par \par Gait and Station:\par Gait: slow shuffling gait with a cart, no significant extremity swelling or lymphedema, poor balance, trophic changes of both legs\par \par Radiology Results\par o Left Knee : Standing AP, lateral, tunnel, and skyline views of the knee were obtained and revealed severe lateral and moderate medial compartment narrowing.\par \par o Left Knee injection : Indication- knee osteoarthritis, Anatomic location- left intra-articular joint space, Spray - area was sterilized with Betadine and alcohol and anesthetized with Ethyl Chloride , needle used-20G, Medications given- 5cc's lidocaine, 0.5cc's kenalog, 0.5 cc's dexamethasone, and patient tolerated it well.

## 2022-03-16 NOTE — DISCUSSION/SUMMARY
[de-identified] : I discussed the underlying pathophysiology of the patient's condition in great detail with the patient. I went over the patient's x-rays with them in great detail. At this time, she will start a course of physical therapy for balance. A prescription was provided. I recommend she walks with a walker instead of the cart. A prescription was provided. The patient elected to receive a cortisone injection into her left knee today and tolerated it well. I instructed the patient on ROM exercises and told them to take it easy. The use of ice and rest was reviewed with the patient. The patient may resume activities tomorrow. She should follow-up in 1 month, at which point we will give her a Monovisc injection. All of her questions were answered. She understands and consents to the plan.\par \par FU 1 month.\par after undergoing PT for balance.\par after a left knee cortisone injection today (03/16/2022). \par after using a walker.

## 2022-03-16 NOTE — ADDENDUM
[FreeTextEntry1] : I, Eddie Arthur, acted solely as a scribe for Dr. Les Mcrae on this date 03/16/2022.\par All medical record entries made by the Scribe were at my, Dr. Les Mcrae, direction and personally dictated by me on 03/16/2022. I have reviewed the chart and agree that the record accurately reflects my personal performance of the history, physical exam, assessment and plan. I have also personally directed, reviewed, and agreed with the chart.

## 2022-04-13 ENCOUNTER — APPOINTMENT (OUTPATIENT)
Dept: ORTHOPEDIC SURGERY | Facility: CLINIC | Age: 87
End: 2022-04-13
Payer: MEDICARE

## 2022-04-13 DIAGNOSIS — M19.072 PRIMARY OSTEOARTHRITIS, LEFT ANKLE AND FOOT: ICD-10-CM

## 2022-04-13 PROCEDURE — 99214 OFFICE O/P EST MOD 30 MIN: CPT | Mod: 25

## 2022-04-13 PROCEDURE — 20611 DRAIN/INJ JOINT/BURSA W/US: CPT | Mod: LT

## 2022-04-13 NOTE — DISCUSSION/SUMMARY
[de-identified] : I went over the pathophysiology of the patient's symptoms in great detail with the patient and her son.\par \par Regarding her left knee: The patient elected to receive a Monovisc injection into her left knee today and tolerated it well. I instructed the patient on ROM exercises and told them to take it easy. The use of ice and rest was reviewed with the patient. The patient may resume activities tomorrow. I reminded the patient that it takes 4 to 6 weeks after the injection to feel symptom relief. \par \par Regarding her left ankle: She has basically no motion of the left ankle. I am prescribing an [place] brace for her to wear on the left ankle. She should bring it to her next visit if she has it. We discussed a possible cortisone injection but she was not interested.\par \par All of their questions were answered. They understand and consent to the plan. \par \par FU in 6 weeks.\par after a left knee Monovisc injection today (04/13/2022). \par after getting a left ankle [place] brace.

## 2022-04-13 NOTE — HISTORY OF PRESENT ILLNESS
[de-identified] : 89 year old female presents complaining of left knee pain. She notes the worst pain when she crosses and uncrosses her legs. She notes her balance is very bad and is afraid of falling. She is ambulating with a folding cart. She received a left knee cortisone injection on 3/16/2022. She presents for a Monovisc injection today (04/13/2022). Her right knee has not bothered her since her Durolane injection on 11/04/19. She also complains of left ankle pain since her ankle surgery done many years.\par Pmhx of A-Fib on Eliquis. She had prior surgery for endometrial cancer and breast cancer. She had a prior right THR. 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 \par \par Radiology Results taken on 3/16/2022:\par o Left Knee : Standing AP, lateral, tunnel, and skyline views of the knee were obtained and revealed severe lateral and moderate medial compartment narrowing.

## 2022-04-13 NOTE — ADDENDUM
[FreeTextEntry1] : I, Eddie Arthur, acted solely as a scribe for Dr. Les Mcrae on this date 04/13/2022.\par All medical record entries made by the Scribe were at my, Dr. Les Mcrae, direction and personally dictated by me on 04/13/2022. I have reviewed the chart and agree that the record accurately reflects my personal performance of the history, physical exam, assessment and plan. I have also personally directed, reviewed, and agreed with the chart.

## 2022-04-13 NOTE — PHYSICAL EXAM
[de-identified] : Constitutional\par o Appearance : well-nourished, well developed, alert, in no acute distress \par Head and Face\par o Head :\par ¦ Inspection : atraumatic, normocephalic\par o Face :\par ¦ Inspection : no visible rash or discoloration\par Respiratory\par o Respiratory Effort: breathing unlabored \par Neurologic\par o Mental Status Examination :\par ¦ Orientation : alert and oriented X 3\par Psychiatric\par o Mood and Affect: mood normal, affect appropriate\par Cardiovascular\par o Observation/Palpation : - no swelling\par Lymphatic\par o Additional Nodes : No palpable lymph nodes present\par \par Right Lower Extremity\par o Buttock : no tenderness, swelling or deformities \par o Right Hip :\par ¦ Inspection/Palpation : no tenderness, swelling or deformities\par ¦ Range of Motion : full and painless in all planes, no crepitance\par ¦ Stability : joint stability intact\par ¦ Strength : extension, flexion, adduction, abduction, internal rotation and external rotation, 4+/5\par \par o Right Knee :\par ¦ Inspection/Palpation : no tenderness to palpation, no swelling\par ¦ Range of Motion : active flexion and extension full and painless, no crepitance\par ¦ Stability : no valgus or varus instability present on provocative testing\par ¦ Strength : flexion and extension 4+/5\par ¦ Tests and Signs : negative Anterior Drawer, negative Lachman, negative Julianna\par \par Left Lower Extremity\par o Buttock : no tenderness, swelling or deformities \par o Left Hip :\par ¦ Inspection/Palpation : no tenderness, no swelling or deformities\par ¦ Range of Motion : full and painless in all planes, no crepitance\par ¦ Stability : joint stability intact\par ¦ Strength : extension, flexion, adduction, abduction, internal rotation and external rotation, 4+/5\par \par o Left Knee :\par ¦ Inspection/Palpation : significant medial and lateral compartment tenderness to palpation, no swelling\par ¦ Range of Motion : 0-120° with pain , no crepitance\par ¦ Stability : no valgus or varus instability present on provocative testing\par ¦ Strength : flexion and extension 4+/5\par ¦ Tests and Signs : negative Anterior Drawer, negative Lachman, negative Julianna\par \par o Left Ankle :\par ¦ Inspection/Palpation : medial and lateral clear space tenderness, no swelling    \par ¦ Range of Motion : essentially no motion of the ankle with pain\par ¦ Stability : no joint instability en provocative testing\par ¦ Strength : all muscles limited\par o Skin : very thin skin with an abrasion on the anterior aspect of the shin\par o Sensation : sensation to pin intact\par o Tests and Signs : Salvador test negative \par \par o Peripheral Vascular System :\par ¦ Dorsalis Pedis Artery : pulse 2+ bilaterally\par ¦ Posterior Tibial Artery : pulse 2+ bilaterally \par \par Gait and Station:\par Gait: slow shuffling gait with a cart, no significant extremity swelling or lymphedema, poor balance, trophic changes of both legs\par \par o Left Knee injection : Indication- knee osteoarthritis, Anatomic location- left intra-articular joint space, Spray - area was sterilized with Betadine and alcohol and anesthetized with Ethyl Chloride, needle used-20G, Medications given- 4cc's Monovisc under Ultrasound guidance. The patient tolerated the procedure well.  oLot# [id number]   oExp: 2024-04-30

## 2022-05-25 ENCOUNTER — APPOINTMENT (OUTPATIENT)
Dept: ORTHOPEDIC SURGERY | Facility: CLINIC | Age: 87
End: 2022-05-25

## 2022-07-25 NOTE — ADDENDUM
[FreeTextEntry1] : I, Moris Moeller , acted solely as a scribe for Dr. Les cMrae on this date 05/29/2020.\par All medical record entries made by the Scribe were at my, Dr. Les Mcrae, direction and personally dictated by me on 05/29/2020. I have reviewed the chart and agree that the record accurately reflects my personal performance of the history, physical exam, assessment and plan. I have also personally directed, reviewed, and agreed with the chart.   NULL

## 2022-08-03 ENCOUNTER — APPOINTMENT (OUTPATIENT)
Dept: ORTHOPEDIC SURGERY | Facility: CLINIC | Age: 87
End: 2022-08-03

## 2022-08-11 ENCOUNTER — APPOINTMENT (OUTPATIENT)
Dept: ORTHOPEDIC SURGERY | Facility: CLINIC | Age: 87
End: 2022-08-11

## 2022-08-11 VITALS — HEIGHT: 63 IN | BODY MASS INDEX: 23.04 KG/M2 | WEIGHT: 130 LBS

## 2022-08-11 DIAGNOSIS — M17.0 BILATERAL PRIMARY OSTEOARTHRITIS OF KNEE: ICD-10-CM

## 2022-08-11 PROCEDURE — 99213 OFFICE O/P EST LOW 20 MIN: CPT | Mod: 25

## 2022-08-11 PROCEDURE — 20610 DRAIN/INJ JOINT/BURSA W/O US: CPT | Mod: LT

## 2022-08-11 NOTE — DISCUSSION/SUMMARY
[de-identified] : I went over the pathophysiology of the patient's symptoms in great detail with the patient. I advised her that the level of her arthritis would normally warrant surgery, but because of her advanced age she is not a candidate for surgery. I informed her that she is entitled to another left knee Monovisc injection any time after 10/13/2022. The patient elected to receive a cortisone injection into her left knee today and tolerated it well. I instructed the patient on ROM exercises and told them to take it easy. The use of ice and rest was reviewed with the patient. The patient may resume activities tomorrow. All of their questions were answered. They understand and consent to the plan.\par \par FU after 10/13/2022 for a left knee Monovisc injection.\par after a left knee cortisone injection today (08/11/2022).

## 2022-08-11 NOTE — PHYSICAL EXAM
[de-identified] : Constitutional\par o Appearance : well-nourished, well developed, alert, in no acute distress \par Head and Face\par o Head :\par ¦ Inspection : atraumatic, normocephalic\par o Face :\par ¦ Inspection : no visible rash or discoloration\par Respiratory\par o Respiratory Effort: breathing unlabored \par Neurologic\par o Mental Status Examination :\par ¦ Orientation : alert and oriented X 3\par Psychiatric\par o Mood and Affect: mood normal, affect appropriate\par Cardiovascular\par o Observation/Palpation : - no swelling\par Lymphatic\par o Additional Nodes : No palpable lymph nodes present\par \par Right Lower Extremity\par o Buttock : no tenderness, swelling or deformities \par o Right Hip :\par ¦ Inspection/Palpation : no tenderness, swelling or deformities\par ¦ Range of Motion : full and painless in all planes, no crepitance\par ¦ Stability : joint stability intact\par ¦ Strength : extension, flexion, adduction, abduction, internal rotation and external rotation, 4+/5\par \par o Right Knee :\par ¦ Inspection/Palpation : no tenderness to palpation, no swelling\par ¦ Range of Motion : active flexion and extension full and painless, no crepitance\par ¦ Stability : no valgus or varus instability present on provocative testing\par ¦ Strength : flexion and extension 4+/5\par ¦ Tests and Signs : negative Anterior Drawer, negative Lachman, negative Julianna\par \par Left Lower Extremity\par o Buttock : no tenderness, swelling or deformities \par o Left Hip :\par ¦ Inspection/Palpation : no tenderness, no swelling or deformities\par ¦ Range of Motion : full and painless in all planes, no crepitance\par ¦ Stability : joint stability intact\par ¦ Strength : extension, flexion, adduction, abduction, internal rotation and external rotation, 4+/5\par \par o Left Knee :\par ¦ Inspection/Palpation : medial compartment tenderness to palpation, no swelling\par ¦ Range of Motion : 0-120°, pain with full extension, no crepitance\par ¦ Stability : mild valgus / varus instability present on provocative testing\par ¦ Strength : flexion and extension 4+/5\par ¦ Tests and Signs : negative Anterior Drawer, negative Lachman, negative Julianna\par \par o Left Ankle :\par ¦ Inspection/Palpation : medial and lateral clear space tenderness, no swelling    \par ¦ Range of Motion : essentially no motion of the ankle with pain\par ¦ Stability : no joint instability en provocative testing\par ¦ Strength : all muscles limited\par o Skin : very thin skin with an abrasion on the anterior aspect of the shin\par o Sensation : sensation to pin intact\par o Tests and Signs : Salvador test negative \par \par o Peripheral Vascular System :\par ¦ Dorsalis Pedis Artery : pulse 2+ bilaterally\par ¦ Posterior Tibial Artery : pulse 2+ bilaterally \par \par Gait and Station:\par Gait: slow shuffling gait with a cart, no significant extremity swelling or lymphedema, poor balance, trophic changes of both legs\par \par o Left Knee injection : Indication- knee osteoarthritis, Anatomic location- left intra-articular joint space, Spray - area was sterilized with Betadine and alcohol and anesthetized with Ethyl Chloride , needle used-20G, Medications given- 5cc's lidocaine, 0.5cc's kenalog, 0.5 cc's dexamethasone, and patient tolerated it well.

## 2022-08-11 NOTE — ADDENDUM
[FreeTextEntry1] : I, Eddie Arthur, acted solely as a scribe for Dr. Les Mcrae on this date 08/11/2022.\par All medical record entries made by the Scribe were at my, Dr. Les Mcrae, direction and personally dictated by me on 08/11/2022. I have reviewed the chart and agree that the record accurately reflects my personal performance of the history, physical exam, assessment and plan. I have also personally directed, reviewed, and agreed with the chart.

## 2022-09-08 ENCOUNTER — APPOINTMENT (OUTPATIENT)
Dept: ORTHOPEDIC SURGERY | Facility: CLINIC | Age: 87
End: 2022-09-08

## 2022-09-08 VITALS — HEIGHT: 63 IN | BODY MASS INDEX: 23.04 KG/M2 | WEIGHT: 130 LBS

## 2022-09-08 DIAGNOSIS — M54.2 CERVICALGIA: ICD-10-CM

## 2022-09-08 DIAGNOSIS — M47.812 SPONDYLOSIS W/OUT MYELOPATHY OR RADICULOPATHY, CERVICAL REGION: ICD-10-CM

## 2022-09-08 PROCEDURE — 72040 X-RAY EXAM NECK SPINE 2-3 VW: CPT

## 2022-09-08 PROCEDURE — 72070 X-RAY EXAM THORAC SPINE 2VWS: CPT

## 2022-09-08 PROCEDURE — 99213 OFFICE O/P EST LOW 20 MIN: CPT

## 2022-09-08 RX ORDER — METHYLPREDNISOLONE 4 MG/1
4 TABLET ORAL
Qty: 21 | Refills: 0 | Status: ACTIVE | COMMUNITY
Start: 2022-09-08 | End: 1900-01-01

## 2022-09-08 NOTE — HISTORY OF PRESENT ILLNESS
[de-identified] : I saw this 89-year-old woman last year for complaints of acute lower back pain.  She did not have radicular symptoms.  X-rays revealed what appeared to be an old healed compression fracture in the upper lumbar spine and multilevel degenerative changes.  She is on Eliquis for atrial fibrillation and was advised to use heat and Tylenol for pain control and I was to see her for follow-up in 3 weeks if the symptoms did not resolve but they did resolve.  She returns today along with her son as 3 to 4 days ago she awoke with severe left-sided neck pain.  There is no history of injury.  The pain does not radiate down her arms.  She has not had associated neurologic symptoms.  There have been no recent changes in her gait or balance as a result of this.  She is using a lidocaine patch without help.  Recently she has become constipated.  She has a history of vacillating constipation and diarrhea.

## 2022-09-08 NOTE — PHYSICAL EXAM
[de-identified] : There is pain with range of motion of the cervical spine and a restriction of range of motion of the spine. [de-identified] : In light of his complaints I obtained AP and lateral x-rays of the cervical and thoracic spine.  Sagittal alignment is normal and there are no destructive changes.  There are less than the normal age-appropriate degenerative changes.  There is no evidence of a fracture.

## 2022-09-08 NOTE — DISCUSSION/SUMMARY
[de-identified] : She will rest and use moist heat.  She has been started on a Medrol dose pack.  On examination today she is in atrial fibrillation but it is slow.  She can take extra strength Tylenol up to 8 a day.  They will call me in a week if she has not made satisfactory progress.

## 2022-10-04 ENCOUNTER — APPOINTMENT (OUTPATIENT)
Dept: ORTHOPEDIC SURGERY | Facility: CLINIC | Age: 87
End: 2022-10-04

## 2022-10-24 ENCOUNTER — APPOINTMENT (OUTPATIENT)
Dept: ORTHOPEDIC SURGERY | Facility: CLINIC | Age: 87
End: 2022-10-24

## 2022-11-01 ENCOUNTER — APPOINTMENT (OUTPATIENT)
Dept: ORTHOPEDIC SURGERY | Facility: CLINIC | Age: 87
End: 2022-11-01

## 2023-06-16 ENCOUNTER — EMERGENCY (EMERGENCY)
Facility: HOSPITAL | Age: 88
LOS: 1 days | Discharge: HOME | End: 2023-06-16
Attending: EMERGENCY MEDICINE | Admitting: EMERGENCY MEDICINE
Payer: MEDICARE

## 2023-06-16 VITALS
RESPIRATION RATE: 17 BRPM | HEART RATE: 61 BPM | DIASTOLIC BLOOD PRESSURE: 82 MMHG | OXYGEN SATURATION: 95 % | TEMPERATURE: 98 F | SYSTOLIC BLOOD PRESSURE: 152 MMHG

## 2023-06-16 VITALS
RESPIRATION RATE: 16 BRPM | SYSTOLIC BLOOD PRESSURE: 128 MMHG | TEMPERATURE: 98 F | DIASTOLIC BLOOD PRESSURE: 56 MMHG | HEART RATE: 62 BPM | OXYGEN SATURATION: 97 %

## 2023-06-16 LAB
ALBUMIN SERPL ELPH-MCNC: 3.9 G/DL — SIGNIFICANT CHANGE UP (ref 3.3–5)
ALP SERPL-CCNC: 45 U/L — SIGNIFICANT CHANGE UP (ref 40–120)
ALT FLD-CCNC: 19 U/L — SIGNIFICANT CHANGE UP (ref 4–33)
ANION GAP SERPL CALC-SCNC: 12 MMOL/L — SIGNIFICANT CHANGE UP (ref 7–14)
AST SERPL-CCNC: 26 U/L — SIGNIFICANT CHANGE UP (ref 4–32)
B PERT DNA SPEC QL NAA+PROBE: SIGNIFICANT CHANGE UP
B PERT+PARAPERT DNA PNL SPEC NAA+PROBE: SIGNIFICANT CHANGE UP
BASE EXCESS BLDV CALC-SCNC: -2.8 MMOL/L — LOW (ref -2–3)
BASOPHILS # BLD AUTO: 0.02 K/UL — SIGNIFICANT CHANGE UP (ref 0–0.2)
BASOPHILS NFR BLD AUTO: 0.5 % — SIGNIFICANT CHANGE UP (ref 0–2)
BILIRUB SERPL-MCNC: 1.4 MG/DL — HIGH (ref 0.2–1.2)
BLOOD GAS VENOUS COMPREHENSIVE RESULT: SIGNIFICANT CHANGE UP
BORDETELLA PARAPERTUSSIS (RAPRVP): SIGNIFICANT CHANGE UP
BUN SERPL-MCNC: 12 MG/DL — SIGNIFICANT CHANGE UP (ref 7–23)
C PNEUM DNA SPEC QL NAA+PROBE: SIGNIFICANT CHANGE UP
CALCIUM SERPL-MCNC: 9.5 MG/DL — SIGNIFICANT CHANGE UP (ref 8.4–10.5)
CHLORIDE BLDV-SCNC: 105 MMOL/L — SIGNIFICANT CHANGE UP (ref 96–108)
CHLORIDE SERPL-SCNC: 104 MMOL/L — SIGNIFICANT CHANGE UP (ref 98–107)
CO2 BLDV-SCNC: 24.6 MMOL/L — SIGNIFICANT CHANGE UP (ref 22–26)
CO2 SERPL-SCNC: 20 MMOL/L — LOW (ref 22–31)
CREAT SERPL-MCNC: 0.71 MG/DL — SIGNIFICANT CHANGE UP (ref 0.5–1.3)
EGFR: 79 ML/MIN/1.73M2 — SIGNIFICANT CHANGE UP
EOSINOPHIL # BLD AUTO: 0.05 K/UL — SIGNIFICANT CHANGE UP (ref 0–0.5)
EOSINOPHIL NFR BLD AUTO: 1.3 % — SIGNIFICANT CHANGE UP (ref 0–6)
FLUAV SUBTYP SPEC NAA+PROBE: SIGNIFICANT CHANGE UP
FLUBV RNA SPEC QL NAA+PROBE: SIGNIFICANT CHANGE UP
GAS PNL BLDV: 134 MMOL/L — LOW (ref 136–145)
GLUCOSE BLDV-MCNC: 93 MG/DL — SIGNIFICANT CHANGE UP (ref 70–99)
GLUCOSE SERPL-MCNC: 94 MG/DL — SIGNIFICANT CHANGE UP (ref 70–99)
HADV DNA SPEC QL NAA+PROBE: SIGNIFICANT CHANGE UP
HCO3 BLDV-SCNC: 23 MMOL/L — SIGNIFICANT CHANGE UP (ref 22–29)
HCOV 229E RNA SPEC QL NAA+PROBE: SIGNIFICANT CHANGE UP
HCOV HKU1 RNA SPEC QL NAA+PROBE: SIGNIFICANT CHANGE UP
HCOV NL63 RNA SPEC QL NAA+PROBE: SIGNIFICANT CHANGE UP
HCOV OC43 RNA SPEC QL NAA+PROBE: SIGNIFICANT CHANGE UP
HCT VFR BLD CALC: 36.5 % — SIGNIFICANT CHANGE UP (ref 34.5–45)
HCT VFR BLDA CALC: 36 % — SIGNIFICANT CHANGE UP (ref 34.5–46.5)
HGB BLD CALC-MCNC: 11.9 G/DL — SIGNIFICANT CHANGE UP (ref 11.7–16.1)
HGB BLD-MCNC: 11.9 G/DL — SIGNIFICANT CHANGE UP (ref 11.5–15.5)
HMPV RNA SPEC QL NAA+PROBE: SIGNIFICANT CHANGE UP
HPIV1 RNA SPEC QL NAA+PROBE: SIGNIFICANT CHANGE UP
HPIV2 RNA SPEC QL NAA+PROBE: SIGNIFICANT CHANGE UP
HPIV3 RNA SPEC QL NAA+PROBE: SIGNIFICANT CHANGE UP
HPIV4 RNA SPEC QL NAA+PROBE: SIGNIFICANT CHANGE UP
IANC: 2.16 K/UL — SIGNIFICANT CHANGE UP (ref 1.8–7.4)
IMM GRANULOCYTES NFR BLD AUTO: 0.3 % — SIGNIFICANT CHANGE UP (ref 0–0.9)
LACTATE BLDV-MCNC: 1.2 MMOL/L — SIGNIFICANT CHANGE UP (ref 0.5–2)
LYMPHOCYTES # BLD AUTO: 0.97 K/UL — LOW (ref 1–3.3)
LYMPHOCYTES # BLD AUTO: 25.1 % — SIGNIFICANT CHANGE UP (ref 13–44)
M PNEUMO DNA SPEC QL NAA+PROBE: SIGNIFICANT CHANGE UP
MAGNESIUM SERPL-MCNC: 1.7 MG/DL — SIGNIFICANT CHANGE UP (ref 1.6–2.6)
MCHC RBC-ENTMCNC: 32.2 PG — SIGNIFICANT CHANGE UP (ref 27–34)
MCHC RBC-ENTMCNC: 32.6 GM/DL — SIGNIFICANT CHANGE UP (ref 32–36)
MCV RBC AUTO: 98.6 FL — SIGNIFICANT CHANGE UP (ref 80–100)
MONOCYTES # BLD AUTO: 0.65 K/UL — SIGNIFICANT CHANGE UP (ref 0–0.9)
MONOCYTES NFR BLD AUTO: 16.8 % — HIGH (ref 2–14)
NEUTROPHILS # BLD AUTO: 2.16 K/UL — SIGNIFICANT CHANGE UP (ref 1.8–7.4)
NEUTROPHILS NFR BLD AUTO: 56 % — SIGNIFICANT CHANGE UP (ref 43–77)
NRBC # BLD: 0 /100 WBCS — SIGNIFICANT CHANGE UP (ref 0–0)
NRBC # FLD: 0 K/UL — SIGNIFICANT CHANGE UP (ref 0–0)
PCO2 BLDV: 44 MMHG — SIGNIFICANT CHANGE UP (ref 39–52)
PH BLDV: 7.33 — SIGNIFICANT CHANGE UP (ref 7.32–7.43)
PLATELET # BLD AUTO: 154 K/UL — SIGNIFICANT CHANGE UP (ref 150–400)
PO2 BLDV: 30 MMHG — SIGNIFICANT CHANGE UP (ref 25–45)
POTASSIUM BLDV-SCNC: 4.3 MMOL/L — SIGNIFICANT CHANGE UP (ref 3.5–5.1)
POTASSIUM SERPL-MCNC: 4.4 MMOL/L — SIGNIFICANT CHANGE UP (ref 3.5–5.3)
POTASSIUM SERPL-SCNC: 4.4 MMOL/L — SIGNIFICANT CHANGE UP (ref 3.5–5.3)
PROT SERPL-MCNC: 6.8 G/DL — SIGNIFICANT CHANGE UP (ref 6–8.3)
RAPID RVP RESULT: SIGNIFICANT CHANGE UP
RBC # BLD: 3.7 M/UL — LOW (ref 3.8–5.2)
RBC # FLD: 13.8 % — SIGNIFICANT CHANGE UP (ref 10.3–14.5)
RSV RNA SPEC QL NAA+PROBE: SIGNIFICANT CHANGE UP
RV+EV RNA SPEC QL NAA+PROBE: SIGNIFICANT CHANGE UP
SAO2 % BLDV: 43 % — LOW (ref 67–88)
SARS-COV-2 RNA SPEC QL NAA+PROBE: SIGNIFICANT CHANGE UP
SODIUM SERPL-SCNC: 136 MMOL/L — SIGNIFICANT CHANGE UP (ref 135–145)
WBC # BLD: 3.86 K/UL — SIGNIFICANT CHANGE UP (ref 3.8–10.5)
WBC # FLD AUTO: 3.86 K/UL — SIGNIFICANT CHANGE UP (ref 3.8–10.5)

## 2023-06-16 PROCEDURE — 99285 EMERGENCY DEPT VISIT HI MDM: CPT | Mod: GC

## 2023-06-16 PROCEDURE — 93010 ELECTROCARDIOGRAM REPORT: CPT

## 2023-06-16 RX ORDER — SODIUM CHLORIDE 9 MG/ML
500 INJECTION INTRAMUSCULAR; INTRAVENOUS; SUBCUTANEOUS ONCE
Refills: 0 | Status: COMPLETED | OUTPATIENT
Start: 2023-06-16 | End: 2023-06-16

## 2023-06-16 RX ADMIN — SODIUM CHLORIDE 500 MILLILITER(S): 9 INJECTION INTRAMUSCULAR; INTRAVENOUS; SUBCUTANEOUS at 12:49

## 2023-06-16 RX ADMIN — SODIUM CHLORIDE 500 MILLILITER(S): 9 INJECTION INTRAMUSCULAR; INTRAVENOUS; SUBCUTANEOUS at 11:49

## 2023-06-16 NOTE — ED PROVIDER NOTE - PATIENT PORTAL LINK FT
You can access the FollowMyHealth Patient Portal offered by [facility] by registering at the following website: [email or web address]. By joining [employer]’s FollowMyHealth portal, you will also be able to view your health information using other applications (apps) compatible with our system.

## 2023-06-16 NOTE — ED PROVIDER NOTE - PROGRESS NOTE DETAILS
Improvement on symptoms. Passed PO challenge. Spoke to patient/family about results including incidental findings. Plan to discharge patient. Patient given GI and PCP follow up and return precautions. Patient/family agrees with plan. Improvement on symptoms. Passed PO challenge. Spoke to patient/family about results including incidental findings. Plan to discharge patient. Patient given cardiology, GI, and PCP follow up and return precautions. Patient/family agrees with plan.

## 2023-06-16 NOTE — ED PROVIDER NOTE - NSICDXPASTSURGICALHX_GEN_ALL_CORE_FT
PAST SURGICAL HISTORY:  History of Mastectomy     History of Mastectomy     History of Thyroidectomy

## 2023-06-16 NOTE — ED ADULT NURSE NOTE - OBJECTIVE STATEMENT
Received patient in room 22 c/o diarrhea x 3 days, patient denies fever, chills, abd pain, n/v. Patient is on cardiac monitor AFIB w/O2 sat 98% on a room air. Patient is A&OX4, airway patent, breathing unlabored and even, radial pulses palpable. Labs obtained, 22G IV placed on right arm, IV fluid bolus infusing. Side rails up and safety maintained. Fall precaution in place. Call bells within reach. Family at bedside. Received patient in room 22 c/o diarrhea x 3 days, patient denies fever, chills, abd pain, n/v. PMHX HTN, HLD, Breast cancer, Uterine cancer. Patient is on cardiac monitor AFIB w/O2 sat 98% on a room air. Patient is A&OX4, airway patent, breathing unlabored and even, radial pulses palpable. Labs obtained, 22G IV placed on right arm, IV fluid bolus infusing. Side rails up and safety maintained. Fall precaution in place. Call bells within reach. Family at bedside.

## 2023-06-16 NOTE — ED PROVIDER NOTE - NSFOLLOWUPINSTRUCTIONS_ED_ALL_ED_FT
You were seen in the Emergency Department for diarrhea.     1) Advance activity as tolerated.   2) Continue all previously prescribed medications as directed.    3) Follow up with GI and your primary care physician in 24-48 hours - take copies of your results.    4) Return to the Emergency Department for worsening or persistent symptoms, and/or ANY NEW OR CONCERNING SYMPTOMS.    Diarrhea    Diarrhea is frequent loose or watery bowel movements that has many causes. Diarrhea can make you feel weak and cause you to become dehydrated. Diarrhea typically lasts 2–3 days, but can last longer if it is a sign of something more serious. Drink clear fluids to prevent dehydration. Eat bland, easy-to-digest foods as tolerated.     SEEK IMMEDIATE MEDICAL CARE IF YOU HAVE ANY OF THE FOLLOWING SYMPTOMS: high fevers, lightheadedness/dizziness, chest pain, black or bloody stools, shortness of breath, severe abdominal or back pain, or any signs of dehydration. You were seen in the Emergency Department for diarrhea.     1) Advance activity as tolerated.   2) Continue all previously prescribed medications as directed.    3) Follow up with cardiologist (for your abnormal heart rhythm that included 3 second pauses), GI, and your primary care physician in 4-6 days - take copies of your results.    4) Return to the Emergency Department for worsening or persistent symptoms, and/or ANY NEW OR CONCERNING SYMPTOMS.    Diarrhea    Diarrhea is frequent loose or watery bowel movements that has many causes. Diarrhea can make you feel weak and cause you to become dehydrated. Diarrhea typically lasts 2–3 days, but can last longer if it is a sign of something more serious. Drink clear fluids to prevent dehydration. Eat bland, easy-to-digest foods as tolerated.     SEEK IMMEDIATE MEDICAL CARE IF YOU HAVE ANY OF THE FOLLOWING SYMPTOMS: high fevers, lightheadedness/dizziness, chest pain, black or bloody stools, shortness of breath, severe abdominal or back pain, or any signs of dehydration.

## 2023-06-16 NOTE — ED PROVIDER NOTE - PHYSICAL EXAMINATION
General:  NAD  HEENT:  NCAT, PERRL  Cardiac:  RRR, no murmurs, 2+ peripheral pulses  Chest:  CTA  Abdomen: soft, non-distended, bowel sounds present, no ttp, no rebound or guarding  Extremities: no peripheral edema, calf tenderness, or leg size discrepancies  Skin: no rashes  Neuro: AAOx4, 5+motor, sensory grossly intact  Psych: mood and affect appropriate

## 2023-06-16 NOTE — ED PROVIDER NOTE - CLINICAL SUMMARY MEDICAL DECISION MAKING FREE TEXT BOX
Impression: 89-year-old female pmhx of hypertension, hyperlipidemia, uterine cancer status post hysterectomy, breast cancer status postmastectomy, thyroidectomy, cholecystectomy, chronic history of post radiation-induced diarrhea comes to ED w/ diarrhea.  Their symptoms and exam findings are concerning for viral illness, exacerbation of post radiation-induced diarrhea.    Ordered labs, medications for diagnosis, management, and treatment.

## 2023-06-16 NOTE — ED ADULT NURSE NOTE - NSFALLHARMRISKINTERV_ED_ALL_ED
Assistance OOB with selected safe patient handling equipment if applicable/Communicate risk of Fall with Harm to all staff, patient, and family/Provide visual cue: red socks, yellow wristband, yellow gown, etc/Reinforce activity limits and safety measures with patient and family/Toileting schedule using arm’s reach rule for commode and bathroom/Bed in lowest position, wheels locked, appropriate side rails in place/Call bell, personal items and telephone in reach/Instruct patient to call for assistance before getting out of bed/chair/stretcher/Non-slip footwear applied when patient is off stretcher/[place] to call system/Physically safe environment - no spills, clutter or unnecessary equipment/Purposeful Proactive Rounding/Room/bathroom lighting operational, light cord in reach

## 2023-06-16 NOTE — ED ADULT TRIAGE NOTE - CHIEF COMPLAINT QUOTE
Pt c/o non-bloody diarrhea since Tuesday, denies N+V, denies abd pain, denies recent antibiotics use. Pt reports being prone to diarrhea.

## 2023-06-16 NOTE — ED PROVIDER NOTE - ATTENDING WITH...
54 Resident

## 2023-06-16 NOTE — ED PROVIDER NOTE - ATTENDING CONTRIBUTION TO CARE
Agree with resident note  89-year-old female with past medical history of hypertension, hyperlipidemia, uterine cancer status post hysterectomy, breast cancer status postmastectomy, thyroidectomy, cholecystectomy, long history of radiation induced diarrhea presents with diarrhea.  Patient states this is consistent with prior episodes from postradiation.  States occurs approximately once or twice a year.  Typically comes to the emergency room receives IV hydration feels better and is discharged home.  Denies fevers, abdominal pain.  Patient has a history of A-fib.  Physical exam  Pleasant elderly female in no respiratory distress  Vital signs stable  Clear to auscultation bilaterally  Irregularly irregular  Abdomen soft nontender nondistended  Extremities no edema  EKG A-fib with slow conduction rate  On telemetry patient has multiple pauses of over 2 to 3 seconds  Impression  Post radiation diarrhea we will check labs hydrate patient and reassess patient he has been in the emergency room for 3 to 4 hours with no episodes of diarrhea here  We will have EP consulted given prolonged pauses patient states cardiologist has informed her that she needs to follow-up and get multiple test but she is not interested given her age  We will reassess

## 2023-06-25 ENCOUNTER — EMERGENCY (EMERGENCY)
Facility: HOSPITAL | Age: 88
LOS: 1 days | Discharge: HOME | End: 2023-06-25
Attending: STUDENT IN AN ORGANIZED HEALTH CARE EDUCATION/TRAINING PROGRAM | Admitting: STUDENT IN AN ORGANIZED HEALTH CARE EDUCATION/TRAINING PROGRAM
Payer: MEDICARE

## 2023-06-25 VITALS
RESPIRATION RATE: 18 BRPM | DIASTOLIC BLOOD PRESSURE: 88 MMHG | OXYGEN SATURATION: 100 % | HEART RATE: 89 BPM | SYSTOLIC BLOOD PRESSURE: 135 MMHG

## 2023-06-25 VITALS
TEMPERATURE: 98 F | HEART RATE: 49 BPM | OXYGEN SATURATION: 97 % | DIASTOLIC BLOOD PRESSURE: 82 MMHG | SYSTOLIC BLOOD PRESSURE: 160 MMHG | RESPIRATION RATE: 18 BRPM

## 2023-06-25 LAB
ALBUMIN SERPL ELPH-MCNC: 3.9 G/DL — SIGNIFICANT CHANGE UP (ref 3.3–5)
ALP SERPL-CCNC: 44 U/L — SIGNIFICANT CHANGE UP (ref 40–120)
ALT FLD-CCNC: 15 U/L — SIGNIFICANT CHANGE UP (ref 4–33)
ANION GAP SERPL CALC-SCNC: 19 MMOL/L — HIGH (ref 7–14)
APTT BLD: 30.3 SEC — SIGNIFICANT CHANGE UP (ref 27–36.3)
AST SERPL-CCNC: 22 U/L — SIGNIFICANT CHANGE UP (ref 4–32)
BASOPHILS # BLD AUTO: 0.01 K/UL — SIGNIFICANT CHANGE UP (ref 0–0.2)
BASOPHILS NFR BLD AUTO: 0.1 % — SIGNIFICANT CHANGE UP (ref 0–2)
BILIRUB SERPL-MCNC: 1.5 MG/DL — HIGH (ref 0.2–1.2)
BUN SERPL-MCNC: 18 MG/DL — SIGNIFICANT CHANGE UP (ref 7–23)
CALCIUM SERPL-MCNC: 9.8 MG/DL — SIGNIFICANT CHANGE UP (ref 8.4–10.5)
CHLORIDE SERPL-SCNC: 99 MMOL/L — SIGNIFICANT CHANGE UP (ref 98–107)
CO2 SERPL-SCNC: 16 MMOL/L — LOW (ref 22–31)
CREAT SERPL-MCNC: 0.64 MG/DL — SIGNIFICANT CHANGE UP (ref 0.5–1.3)
EGFR: 82 ML/MIN/1.73M2 — SIGNIFICANT CHANGE UP
EOSINOPHIL # BLD AUTO: 0 K/UL — SIGNIFICANT CHANGE UP (ref 0–0.5)
EOSINOPHIL NFR BLD AUTO: 0 % — SIGNIFICANT CHANGE UP (ref 0–6)
GLUCOSE SERPL-MCNC: 138 MG/DL — HIGH (ref 70–99)
HCT VFR BLD CALC: 38.2 % — SIGNIFICANT CHANGE UP (ref 34.5–45)
HGB BLD-MCNC: 12.8 G/DL — SIGNIFICANT CHANGE UP (ref 11.5–15.5)
IANC: 5.95 K/UL — SIGNIFICANT CHANGE UP (ref 1.8–7.4)
IMM GRANULOCYTES NFR BLD AUTO: 0.1 % — SIGNIFICANT CHANGE UP (ref 0–0.9)
INR BLD: 2.49 RATIO — HIGH (ref 0.88–1.16)
LYMPHOCYTES # BLD AUTO: 0.85 K/UL — LOW (ref 1–3.3)
LYMPHOCYTES # BLD AUTO: 11.3 % — LOW (ref 13–44)
MAGNESIUM SERPL-MCNC: 1.7 MG/DL — SIGNIFICANT CHANGE UP (ref 1.6–2.6)
MCHC RBC-ENTMCNC: 31.9 PG — SIGNIFICANT CHANGE UP (ref 27–34)
MCHC RBC-ENTMCNC: 33.5 GM/DL — SIGNIFICANT CHANGE UP (ref 32–36)
MCV RBC AUTO: 95.3 FL — SIGNIFICANT CHANGE UP (ref 80–100)
MONOCYTES # BLD AUTO: 0.68 K/UL — SIGNIFICANT CHANGE UP (ref 0–0.9)
MONOCYTES NFR BLD AUTO: 9.1 % — SIGNIFICANT CHANGE UP (ref 2–14)
NEUTROPHILS # BLD AUTO: 5.95 K/UL — SIGNIFICANT CHANGE UP (ref 1.8–7.4)
NEUTROPHILS NFR BLD AUTO: 79.4 % — HIGH (ref 43–77)
NRBC # BLD: 0 /100 WBCS — SIGNIFICANT CHANGE UP (ref 0–0)
NRBC # FLD: 0 K/UL — SIGNIFICANT CHANGE UP (ref 0–0)
PHOSPHATE SERPL-MCNC: 3.9 MG/DL — SIGNIFICANT CHANGE UP (ref 2.5–4.5)
PLATELET # BLD AUTO: 195 K/UL — SIGNIFICANT CHANGE UP (ref 150–400)
POTASSIUM SERPL-MCNC: 3.6 MMOL/L — SIGNIFICANT CHANGE UP (ref 3.5–5.3)
POTASSIUM SERPL-SCNC: 3.6 MMOL/L — SIGNIFICANT CHANGE UP (ref 3.5–5.3)
PROT SERPL-MCNC: 6.8 G/DL — SIGNIFICANT CHANGE UP (ref 6–8.3)
PROTHROM AB SERPL-ACNC: 29.1 SEC — HIGH (ref 10.5–13.4)
RBC # BLD: 4.01 M/UL — SIGNIFICANT CHANGE UP (ref 3.8–5.2)
RBC # FLD: 13.6 % — SIGNIFICANT CHANGE UP (ref 10.3–14.5)
SODIUM SERPL-SCNC: 134 MMOL/L — LOW (ref 135–145)
WBC # BLD: 7.5 K/UL — SIGNIFICANT CHANGE UP (ref 3.8–10.5)
WBC # FLD AUTO: 7.5 K/UL — SIGNIFICANT CHANGE UP (ref 3.8–10.5)

## 2023-06-25 PROCEDURE — 99284 EMERGENCY DEPT VISIT MOD MDM: CPT | Mod: GC

## 2023-06-25 PROCEDURE — 93010 ELECTROCARDIOGRAM REPORT: CPT

## 2023-06-25 RX ORDER — ONDANSETRON 8 MG/1
4 TABLET, FILM COATED ORAL ONCE
Refills: 0 | Status: COMPLETED | OUTPATIENT
Start: 2023-06-25 | End: 2023-06-25

## 2023-06-25 RX ORDER — FAMOTIDINE 10 MG/ML
20 INJECTION INTRAVENOUS ONCE
Refills: 0 | Status: COMPLETED | OUTPATIENT
Start: 2023-06-25 | End: 2023-06-25

## 2023-06-25 RX ORDER — ONDANSETRON 8 MG/1
1 TABLET, FILM COATED ORAL
Qty: 9 | Refills: 0
Start: 2023-06-25 | End: 2023-06-27

## 2023-06-25 RX ORDER — SODIUM CHLORIDE 9 MG/ML
1000 INJECTION INTRAMUSCULAR; INTRAVENOUS; SUBCUTANEOUS ONCE
Refills: 0 | Status: COMPLETED | OUTPATIENT
Start: 2023-06-25 | End: 2023-06-25

## 2023-06-25 RX ADMIN — FAMOTIDINE 20 MILLIGRAM(S): 10 INJECTION INTRAVENOUS at 16:18

## 2023-06-25 RX ADMIN — SODIUM CHLORIDE 1000 MILLILITER(S): 9 INJECTION INTRAMUSCULAR; INTRAVENOUS; SUBCUTANEOUS at 16:19

## 2023-06-25 RX ADMIN — ONDANSETRON 4 MILLIGRAM(S): 8 TABLET, FILM COATED ORAL at 16:18

## 2023-06-25 NOTE — ED PROVIDER NOTE - ATTENDING CONTRIBUTION TO CARE
89-year-old female past medical history hypertension, hyperlipidemia, A-fib on anticoagulation remote history of breast cancer and uterine cancer status post surgery and radiation, chronic diarrhea presents with son for nauseous vomiting since yesterday and now generalized weakness making it difficult to ambulate.  Patient and son report having similar episodes in the past.  Patient denies abdominal pain diarrhea constipation.  She denies any fevers, chills, dysuria or hematuria urinary frequency.  She denies any chest pain shortness of breath.  She denies any back pain.  She denies any headaches vision changes hearing changes dizziness focal weakness.  Patient unable to tolerate p.o. today.  Reports taking home medications as prescribed.  Exam as above.  Patient with nausea vomiting patient afebrile with a soft nontender abdomen.  Low suspicion for surgical pathology, bacterial infectious etiology.  Will assess electrolytes.  Patient does appear mildly dehydrated.  Plan: Labs, symptom relief, IV fluids, reassess.

## 2023-06-25 NOTE — ED PROVIDER NOTE - PROGRESS NOTE DETAILS
Amrit Martinez, PGY-3: Pt reexamined at bedside, resting comfortably, vitals stable. Patient reassessed, feels much improved after IV fluids, no longer feeling weak however we will ambulate prior to any discharge.  CBC is within normal limits without anemia or leukocytosis.    CMP is also unremarkable with no significant electrolyte derangement.  Lipase is negative.  Patient has received famotidine and Zofran in feels back to baseline.  Will attempt a p.o. challenge and ambulate and likely discharge home as patient is requesting to leave. You were evaluated today for patient tolerated p.o., conversation had with patient and the son regarding disposition and lab work.  Will discharge home on ondansetron.

## 2023-06-25 NOTE — ED ADULT NURSE NOTE - HIV OFFER
Pt declines complaints of pain, malaise, or cardiac symptoms. A&Ox4, lungs diminished with equal expansion, on rm air. Pt in NSR, on monitor. Abdomen soft and non-tender with active bowel sounds in all 4 quadrants, continent of B&B. Temporary hemodialysis catheter dressing intact with old drainage. Patient  updated with plan of care. Temporary HD catheter removed. Dressing clean, dry and intact. Vital signs stable.   Problem: Diabetes/Glucose Control  Goal: Glucose maintained within prescribed range  Description: INTERVENTIONS:  - Monitor Blood Glucose as ordered  - Assess for signs and symptoms of hyperglycemia and hypoglycemia  - Administer ordered medications to maintain glucose within target range  - Assess barriers to adequate nutritional intake and initiate nutrition consult as needed  - Instruct patient on self management of diabetes  Outcome: Progressing     Problem: Patient/Family Goals  Goal: Patient/Family Long Term Goal  Description: Patient's Long Term Goal: \"go home\"    Interventions:  -Consults to see  -Labs  -Med compliance  - See additional Care Plan goals for specific interventions  Outcome: Progressing  Goal: Patient/Family Short Term Goal  Description: Patient's Short Term Goal: \"decrease pain\"    Interventions:   -PRN pain meds  -ambulate as tolerated  - See additional Care Plan goals for specific interventions  Outcome: Progressing     Problem: RESPIRATORY - ADULT  Goal: Achieves optimal ventilation and oxygenation  Description: INTERVENTIONS:  - Assess for changes in respiratory status  - Assess for changes in mentation and behavior  - Position to facilitate oxygenation and minimize respiratory effort  - Oxygen supplementation based on oxygen saturation or ABGs  - Provide Smoking Cessation handout, if applicable  - Encourage broncho-pulmonary hygiene including cough, deep breathe, Incentive Spirometry  - Assess the need for suctioning and perform as needed  - Assess and instruct to report SOB or any respiratory difficulty  - Respiratory Therapy support as indicated  - Manage/alleviate anxiety  - Monitor for signs/symptoms of CO2 retention  Outcome: Progressing Previously Declined (within the last year)

## 2023-06-25 NOTE — ED PROVIDER NOTE - NS ED ROS FT
Cuba Johnson DO:   Constitutional: No fever. no chills.  +generalized weakness.   Eyes: No visual changes, eye pain or redness  HEENT: No throat pain, hearing changes, ear pain, nasal pain. No nose bleeding.  CV: No chest pain, no palpitations  Resp: No shortness of breath, no cough  GI: No abdominal pain. + nausea. +  vomiting. No diarrhea. No constipation.   : No dysuria, hematuria. no urinary frequency, no urinary urgency.  MSK: No musculoskeletal pain  Skin: No rash  Neuro: No headache. No numbness or tingling. No weakness. No dizziness.  Allergy/Immunology: no allergy to medicine

## 2023-06-25 NOTE — ED ADULT NURSE NOTE - NSFALLUNIVINTERV_ED_ALL_ED
Bed/Stretcher in lowest position, wheels locked, appropriate side rails in place/Call bell, personal items and telephone in reach/Instruct patient to call for assistance before getting out of bed/chair/stretcher/Non-slip footwear applied when patient is off stretcher/[place] to call system/Physically safe environment - no spills, clutter or unnecessary equipment/Purposeful proactive rounding/Room/bathroom lighting operational, light cord in reach

## 2023-06-25 NOTE — ED PROVIDER NOTE - OBJECTIVE STATEMENT
89yo F pmh HTN/HLD, Uterine Cancer (s/p hysterectomy), Breast ca (s/p mastectomy), thyroidcectomy, cholecystectomy - presents to ED with 1 day history of n/v and weakness now limiting ADLs, ambulating. 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 Not currently tollerating PO. Has had some diarrhea since today.  No chest pain, SOB, Abd pain. Denies fever. Normally self-sufficient, ambulates well.

## 2023-06-25 NOTE — ED ADULT NURSE NOTE - CHIEF COMPLAINT QUOTE
Patient c/o weakness, nausea and vomiting x 1day.  Patient unable to walk at this time due to weakness.  Denies CP or SOB.  Breathing non-labored.  No facial droop, blurred vision.  Equal strength to B/L arms and legs.  PMHx- HTN, A. Fib., HLD.  FS dn.

## 2023-06-25 NOTE — ED ADULT NURSE NOTE - OBJECTIVE STATEMENT
patient received to [facility] 3a. Patient is a&ox4 ambulatory at baseline, lives alone. Patient c/o nausea throughout the night with weakness. Pt states she was not able to vomit but has been feeeling weak since last night. Patient denies hest pain sob n/v. Patient denies any recent falls. Patient has a IV place awaiting for MD. Patient PHX afib ON ELIQUIS.

## 2023-06-25 NOTE — ED PROVIDER NOTE - PHYSICAL EXAMINATION
Cuba Johnson DO:  GEN: no acute respiratory distress. nontoxic, speaking comfortably in full sentences  HEENT: NCAT. face symmetrical. PERRL 4mm, EOMI, dry MM, oropharynx wnl.  Neck: no JVD, trachea midline, no lymphadenopathy, FROM  CV: RRR. +S1S2, no murmur. 2+ pulses in 4 extremities, cap refill <2 sec  Chest: CTA B/l. no wheezing, rales, rhonchi. no retractions. good air movement.   ABD: +BS, soft, non distended, non tender.   : no cva or suprapubic tenderness  MSK: No clubbing, cyanosis, 1+ edema (chronic) bilateral lower extremities. FROM of all extremities. no tenderness to palpation. No midline or paraspinal tenderness.   Neuro: AAOX3. Sensation and motor grossly intact  SKIN: No rash

## 2023-06-25 NOTE — ED PROVIDER NOTE - PATIENT PORTAL LINK FT
You can access the FollowMyHealth Patient Portal offered by [facility] by registering at the following website: [email or web address]. By joining [employer]’s FollowMyHealth portal, you will also be able to view your health information using other applications (apps) compatible with our system.

## 2023-06-25 NOTE — ED ADULT TRIAGE NOTE - CHIEF COMPLAINT QUOTE
Patient c/o weakness, nausea and vomiting x 1day.  Patient unable to walk at this time due to weakness.  Denies CP or SOB.  Breathing non-labored.  No facial droop, blurred vision.  Equal strength to B/L arms and legs.  PMHx- HTN, A. Fib., HLD. Patient c/o weakness, nausea and vomiting x 1day.  Patient unable to walk at this time due to weakness.  Denies CP or SOB.  Breathing non-labored.  No facial droop, blurred vision.  Equal strength to B/L arms and legs.  PMHx- HTN, A. Fib., HLD.  FS dn.

## 2023-06-25 NOTE — ED PROVIDER NOTE - CLINICAL SUMMARY MEDICAL DECISION MAKING FREE TEXT BOX
89-year-old female presenting for several days diarrhea and vomiting likely secondary to gastroenteritis.  Currently afebrile well-appearing with normal vitals.  Mildly bradycardic however heart rate increases with movement.  Patient feels well however appears dehydrated, will give fluids, check labs and reassess her afterwards.  Will give Zofran and famotidine as therapeutics.  Generally well-appearing will likely discharge home as patient does not want stay in the hospital for any reason

## 2023-06-25 NOTE — ED PROVIDER NOTE - NSFOLLOWUPINSTRUCTIONS_ED_ALL_ED_FT
You were assessed in emergency department today for nausea, vomiting and diarrhea likely caused by a viral gastroenteritis    This does not require antibiotics however it does require you to be well-hydrated    Your work-up today included blood work and EKG the results of which are included within this documentation.  Please follow-up with your primary care doctor in 1 to 2 weeks to discuss your emergency room visit and for reevaluation    Please return to the Emergency Department should you experience any of the following:  - Chest pain, Palpitations, Painful breathing  - Worsening or persistent shortness of breath  - Fever, unexplained weight loss, night sweats  - Blood in stool or in urine  - New weakness, fatigue, or fainting  - Any new concerning symptoms You were assessed in emergency department today for nausea, vomiting and diarrhea likely caused by a viral gastroenteritis    This does not require antibiotics however it does require you to be well-hydrated    A prescription for nausea was sent to your pharmacy.  The medications called ondansetron, please take it only as needed for nausea    Your work-up today included blood work and EKG the results of which are included within this documentation.  Please follow-up with your primary care doctor in 1 to 2 weeks to discuss your emergency room visit and for reevaluation    Please return to the Emergency Department should you experience any of the following:  - Chest pain, Palpitations, Painful breathing  - Worsening or persistent shortness of breath  - Fever, unexplained weight loss, night sweats  - Blood in stool or in urine  - New weakness, fatigue, or fainting  - Any new concerning symptoms

## 2024-02-02 ENCOUNTER — INPATIENT (INPATIENT)
Facility: HOSPITAL | Age: 89
LOS: 4 days | Discharge: HOME HEALTH | End: 2024-02-07
Attending: INTERNAL MEDICINE | Admitting: INTERNAL MEDICINE
Payer: MEDICARE

## 2024-02-02 VITALS
DIASTOLIC BLOOD PRESSURE: 79 MMHG | TEMPERATURE: 98 F | RESPIRATION RATE: 18 BRPM | HEART RATE: 66 BPM | OXYGEN SATURATION: 94 % | SYSTOLIC BLOOD PRESSURE: 149 MMHG

## 2024-02-02 DIAGNOSIS — I48.20 CHRONIC ATRIAL FIBRILLATION, UNSPECIFIED: ICD-10-CM

## 2024-02-02 DIAGNOSIS — R42 DIZZINESS AND GIDDINESS: ICD-10-CM

## 2024-02-02 DIAGNOSIS — I50.22 CHRONIC SYSTOLIC (CONGESTIVE) HEART FAILURE: ICD-10-CM

## 2024-02-02 DIAGNOSIS — E03.9 HYPOTHYROIDISM, UNSPECIFIED: ICD-10-CM

## 2024-02-02 DIAGNOSIS — Z29.9 ENCOUNTER FOR PROPHYLACTIC MEASURES, UNSPECIFIED: ICD-10-CM

## 2024-02-02 LAB
ALBUMIN SERPL ELPH-MCNC: 3.8 G/DL — SIGNIFICANT CHANGE UP (ref 3.3–5)
ALP SERPL-CCNC: 45 U/L — SIGNIFICANT CHANGE UP (ref 40–120)
ALT FLD-CCNC: 11 U/L — SIGNIFICANT CHANGE UP (ref 4–33)
ANION GAP SERPL CALC-SCNC: 14 MMOL/L — SIGNIFICANT CHANGE UP (ref 7–14)
APTT BLD: 34.6 SEC — SIGNIFICANT CHANGE UP (ref 24.5–35.6)
AST SERPL-CCNC: 17 U/L — SIGNIFICANT CHANGE UP (ref 4–32)
BASE EXCESS BLDV CALC-SCNC: 0 MMOL/L — SIGNIFICANT CHANGE UP (ref -2–3)
BASOPHILS # BLD AUTO: 0.03 K/UL — SIGNIFICANT CHANGE UP (ref 0–0.2)
BASOPHILS NFR BLD AUTO: 0.5 % — SIGNIFICANT CHANGE UP (ref 0–2)
BILIRUB SERPL-MCNC: 2.2 MG/DL — HIGH (ref 0.2–1.2)
BLOOD GAS VENOUS COMPREHENSIVE RESULT: SIGNIFICANT CHANGE UP
BUN SERPL-MCNC: 14 MG/DL — SIGNIFICANT CHANGE UP (ref 7–23)
CALCIUM SERPL-MCNC: 9.7 MG/DL — SIGNIFICANT CHANGE UP (ref 8.4–10.5)
CHLORIDE BLDV-SCNC: 103 MMOL/L — SIGNIFICANT CHANGE UP (ref 96–108)
CHLORIDE SERPL-SCNC: 105 MMOL/L — SIGNIFICANT CHANGE UP (ref 98–107)
CO2 BLDV-SCNC: 26.7 MMOL/L — HIGH (ref 22–26)
CO2 SERPL-SCNC: 21 MMOL/L — LOW (ref 22–31)
CREAT SERPL-MCNC: 0.75 MG/DL — SIGNIFICANT CHANGE UP (ref 0.5–1.3)
EGFR: 74 ML/MIN/1.73M2 — SIGNIFICANT CHANGE UP
EOSINOPHIL # BLD AUTO: 0.07 K/UL — SIGNIFICANT CHANGE UP (ref 0–0.5)
EOSINOPHIL NFR BLD AUTO: 1.2 % — SIGNIFICANT CHANGE UP (ref 0–6)
GAS PNL BLDV: 133 MMOL/L — LOW (ref 136–145)
GAS PNL BLDV: SIGNIFICANT CHANGE UP
GLUCOSE BLDV-MCNC: 94 MG/DL — SIGNIFICANT CHANGE UP (ref 70–99)
GLUCOSE SERPL-MCNC: 98 MG/DL — SIGNIFICANT CHANGE UP (ref 70–99)
HCO3 BLDV-SCNC: 25 MMOL/L — SIGNIFICANT CHANGE UP (ref 22–29)
HCT VFR BLD CALC: 40.9 % — SIGNIFICANT CHANGE UP (ref 34.5–45)
HCT VFR BLDA CALC: 42 % — SIGNIFICANT CHANGE UP (ref 34.5–46.5)
HGB BLD CALC-MCNC: 14.1 G/DL — SIGNIFICANT CHANGE UP (ref 11.7–16.1)
HGB BLD-MCNC: 13.7 G/DL — SIGNIFICANT CHANGE UP (ref 11.5–15.5)
IANC: 3.74 K/UL — SIGNIFICANT CHANGE UP (ref 1.8–7.4)
IMM GRANULOCYTES NFR BLD AUTO: 0.3 % — SIGNIFICANT CHANGE UP (ref 0–0.9)
INR BLD: 1.34 RATIO — HIGH (ref 0.85–1.18)
LACTATE BLDV-MCNC: 1.6 MMOL/L — SIGNIFICANT CHANGE UP (ref 0.5–2)
LYMPHOCYTES # BLD AUTO: 1.27 K/UL — SIGNIFICANT CHANGE UP (ref 1–3.3)
LYMPHOCYTES # BLD AUTO: 21.7 % — SIGNIFICANT CHANGE UP (ref 13–44)
MCHC RBC-ENTMCNC: 32.3 PG — SIGNIFICANT CHANGE UP (ref 27–34)
MCHC RBC-ENTMCNC: 33.5 GM/DL — SIGNIFICANT CHANGE UP (ref 32–36)
MCV RBC AUTO: 96.5 FL — SIGNIFICANT CHANGE UP (ref 80–100)
MONOCYTES # BLD AUTO: 0.71 K/UL — SIGNIFICANT CHANGE UP (ref 0–0.9)
MONOCYTES NFR BLD AUTO: 12.2 % — SIGNIFICANT CHANGE UP (ref 2–14)
NEUTROPHILS # BLD AUTO: 3.74 K/UL — SIGNIFICANT CHANGE UP (ref 1.8–7.4)
NEUTROPHILS NFR BLD AUTO: 64.1 % — SIGNIFICANT CHANGE UP (ref 43–77)
NRBC # BLD: 0 /100 WBCS — SIGNIFICANT CHANGE UP (ref 0–0)
NRBC # FLD: 0 K/UL — SIGNIFICANT CHANGE UP (ref 0–0)
PCO2 BLDV: 43 MMHG — SIGNIFICANT CHANGE UP (ref 39–52)
PH BLDV: 7.38 — SIGNIFICANT CHANGE UP (ref 7.32–7.43)
PLATELET # BLD AUTO: 178 K/UL — SIGNIFICANT CHANGE UP (ref 150–400)
PO2 BLDV: 41 MMHG — SIGNIFICANT CHANGE UP (ref 25–45)
POTASSIUM BLDV-SCNC: 6.1 MMOL/L — HIGH (ref 3.5–5.1)
POTASSIUM SERPL-MCNC: 4.3 MMOL/L — SIGNIFICANT CHANGE UP (ref 3.5–5.3)
POTASSIUM SERPL-SCNC: 4.3 MMOL/L — SIGNIFICANT CHANGE UP (ref 3.5–5.3)
PROT SERPL-MCNC: 7.2 G/DL — SIGNIFICANT CHANGE UP (ref 6–8.3)
PROTHROM AB SERPL-ACNC: 15 SEC — HIGH (ref 9.5–13)
RBC # BLD: 4.24 M/UL — SIGNIFICANT CHANGE UP (ref 3.8–5.2)
RBC # FLD: 14.2 % — SIGNIFICANT CHANGE UP (ref 10.3–14.5)
SAO2 % BLDV: 62.6 % — LOW (ref 67–88)
SODIUM SERPL-SCNC: 140 MMOL/L — SIGNIFICANT CHANGE UP (ref 135–145)
TROPONIN T, HIGH SENSITIVITY RESULT: 16 NG/L — SIGNIFICANT CHANGE UP
WBC # BLD: 5.84 K/UL — SIGNIFICANT CHANGE UP (ref 3.8–10.5)
WBC # FLD AUTO: 5.84 K/UL — SIGNIFICANT CHANGE UP (ref 3.8–10.5)

## 2024-02-02 PROCEDURE — 71045 X-RAY EXAM CHEST 1 VIEW: CPT | Mod: 26

## 2024-02-02 PROCEDURE — 99223 1ST HOSP IP/OBS HIGH 75: CPT

## 2024-02-02 PROCEDURE — 70498 CT ANGIOGRAPHY NECK: CPT | Mod: 26,MA

## 2024-02-02 PROCEDURE — 99285 EMERGENCY DEPT VISIT HI MDM: CPT

## 2024-02-02 PROCEDURE — 93010 ELECTROCARDIOGRAM REPORT: CPT

## 2024-02-02 PROCEDURE — 70496 CT ANGIOGRAPHY HEAD: CPT | Mod: 26,MA

## 2024-02-02 PROCEDURE — 70450 CT HEAD/BRAIN W/O DYE: CPT | Mod: 26,MA,XU

## 2024-02-02 RX ORDER — ONDANSETRON 8 MG/1
4 TABLET, FILM COATED ORAL EVERY 8 HOURS
Refills: 0 | Status: DISCONTINUED | OUTPATIENT
Start: 2024-02-02 | End: 2024-02-07

## 2024-02-02 RX ORDER — SIMVASTATIN 20 MG/1
20 TABLET, FILM COATED ORAL AT BEDTIME
Refills: 0 | Status: DISCONTINUED | OUTPATIENT
Start: 2024-02-02 | End: 2024-02-07

## 2024-02-02 RX ORDER — LEVOTHYROXINE SODIUM 125 MCG
1 TABLET ORAL
Refills: 0 | DISCHARGE

## 2024-02-02 RX ORDER — SACUBITRIL AND VALSARTAN 24; 26 MG/1; MG/1
1 TABLET, FILM COATED ORAL
Refills: 0 | DISCHARGE

## 2024-02-02 RX ORDER — MECLIZINE HCL 12.5 MG
12.5 TABLET ORAL EVERY 6 HOURS
Refills: 0 | Status: DISCONTINUED | OUTPATIENT
Start: 2024-02-02 | End: 2024-02-03

## 2024-02-02 RX ORDER — LANOLIN ALCOHOL/MO/W.PET/CERES
3 CREAM (GRAM) TOPICAL AT BEDTIME
Refills: 0 | Status: DISCONTINUED | OUTPATIENT
Start: 2024-02-02 | End: 2024-02-07

## 2024-02-02 RX ORDER — METOPROLOL TARTRATE 50 MG
25 TABLET ORAL DAILY
Refills: 0 | Status: DISCONTINUED | OUTPATIENT
Start: 2024-02-02 | End: 2024-02-07

## 2024-02-02 RX ORDER — APIXABAN 2.5 MG/1
5 TABLET, FILM COATED ORAL
Refills: 0 | Status: DISCONTINUED | OUTPATIENT
Start: 2024-02-02 | End: 2024-02-07

## 2024-02-02 RX ORDER — ACETAMINOPHEN 500 MG
650 TABLET ORAL EVERY 6 HOURS
Refills: 0 | Status: DISCONTINUED | OUTPATIENT
Start: 2024-02-02 | End: 2024-02-07

## 2024-02-02 RX ORDER — MECLIZINE HCL 12.5 MG
25 TABLET ORAL ONCE
Refills: 0 | Status: COMPLETED | OUTPATIENT
Start: 2024-02-02 | End: 2024-02-02

## 2024-02-02 RX ORDER — ONDANSETRON 8 MG/1
4 TABLET, FILM COATED ORAL EVERY 6 HOURS
Refills: 0 | Status: DISCONTINUED | OUTPATIENT
Start: 2024-02-02 | End: 2024-02-02

## 2024-02-02 RX ORDER — APIXABAN 2.5 MG/1
1 TABLET, FILM COATED ORAL
Refills: 0 | DISCHARGE

## 2024-02-02 RX ORDER — SACUBITRIL AND VALSARTAN 24; 26 MG/1; MG/1
1 TABLET, FILM COATED ORAL
Refills: 0 | Status: DISCONTINUED | OUTPATIENT
Start: 2024-02-02 | End: 2024-02-07

## 2024-02-02 RX ORDER — LEVOTHYROXINE SODIUM 125 MCG
112 TABLET ORAL DAILY
Refills: 0 | Status: DISCONTINUED | OUTPATIENT
Start: 2024-02-02 | End: 2024-02-07

## 2024-02-02 RX ORDER — SIMVASTATIN 20 MG/1
1 TABLET, FILM COATED ORAL
Refills: 0 | DISCHARGE

## 2024-02-02 RX ORDER — METOPROLOL TARTRATE 50 MG
1 TABLET ORAL
Refills: 0 | DISCHARGE

## 2024-02-02 RX ADMIN — Medication 25 MILLIGRAM(S): at 15:56

## 2024-02-02 RX ADMIN — APIXABAN 5 MILLIGRAM(S): 2.5 TABLET, FILM COATED ORAL at 18:47

## 2024-02-02 NOTE — ED ADULT NURSE NOTE - OBJECTIVE STATEMENT
Pt BIB son c/o dizziness hx uterine and breast ca, denies any recent falls presents in no acute distress pending md paulo araya rn

## 2024-02-02 NOTE — ED ADULT NURSE REASSESSMENT NOTE - NS ED NURSE REASSESS COMMENT FT1
Received report from day RN. Patient is resting comfortably in stretcher, vitally stable, breathing even and unlabored on room air. Denies dizziness at this time. Son is at bedside. Patient has a spot in ESSU 1, will give report. Safety maintained.

## 2024-02-02 NOTE — ED ADULT NURSE REASSESSMENT NOTE - GENERAL PATIENT STATE
axo4 , Son at bedside. CM in place = Afib Pt with hx of afib on eloquis/comfortable appearance/family/SO at bedside

## 2024-02-02 NOTE — ED ADULT NURSE REASSESSMENT NOTE - NS ED NURSE REASSESS COMMENT FT1
Gave report to ESSU1 RN, patient is at baseline mentation, breathing even and unlabored on room air. Vitally stable. Safety maintained.

## 2024-02-02 NOTE — ED PROVIDER NOTE - ATTENDING CONTRIBUTION TO CARE
I (Marci) agree with above, I performed a history and physical. Counseled prerna medical staff, physician assistant, and/or medical student on medical decision making as documented. Medical decisions and treatment interventions were made in real time during the patient encounter. Additionally and/or with the following exceptions: 89-year-old female past medical history of hypertension, hyperlipidemia, breast cancer, uterine cancer in remission, cholecystectomy is presenting to the emergency department with dizziness.  Says it is room spinning.  She has difficulty ambulating.  Neurologic exam she was unable to ambulate, but 5 out of 5 in all extremities.  Patient has CBC which was within normal limits, coagulation studies with mildly elevated INR, CMP nonactionable, blood gas with lactate less than 2.  CT negative for flow-limiting stenosis.  Patient required admission to the hospital due to inability to ambulate and likely need for physical therapy consultation.  The patient's condition was not amenable to outpatient treatment due to either the lack of feasibility of outpatient care coordination, possibility for further decompensation with adverse outcome if discharge, or treatments and diagnostic  modalities only available during an inpatient hospitalization.

## 2024-02-02 NOTE — ED PROVIDER NOTE - CLINICAL SUMMARY MEDICAL DECISION MAKING FREE TEXT BOX
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 Pt states dizziness is room spinning, worse when standing up, feels she will fall, and as such has not been able to ambulate. Lives alone in apartment, normally ambulates without assistance. denies falls, weakness, tingling/numbness, headache, fever/chills, CP, SOB, N/V/D, dysuria, tinnitus, vision changes. AVSS, A&O3, normal speech, no focal motor or sensory deficits, Unstable when standing and unable to assess gait, normal speech, CN2-12 intact, PERRL. Pt with room spinning dizziness, c/f peripheral vs central etiology. Will obtain labs, CT head, and reassess after pharmacological intervention. None known

## 2024-02-02 NOTE — ED ADULT NURSE NOTE - NSFALLHARMRISKINTERV_ED_ALL_ED
Assistance OOB with selected safe patient handling equipment if applicable/Assistance with ambulation/Communicate risk of Fall with Harm to all staff, patient, and family/Encourage patient to sit up slowly, dangle for a short time, stand at bedside before walking/Monitor gait and stability/Orthostatic vital signs/Provide visual cue: red socks, yellow wristband, yellow gown, etc/Reinforce activity limits and safety measures with patient and family/Bed in lowest position, wheels locked, appropriate side rails in place/Call bell, personal items and telephone in reach/Instruct patient to call for assistance before getting out of bed/chair/stretcher/Non-slip footwear applied when patient is off stretcher/[place] to call system/Physically safe environment - no spills, clutter or unnecessary equipment/Purposeful Proactive Rounding/Room/bathroom lighting operational, light cord in reach

## 2024-02-02 NOTE — H&P ADULT - PROBLEM SELECTOR PLAN 1
suspect peripheral etiology  -CTH and CTA headache/neck no acute pathology or high grade stenosis. 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 will re-assess  -supportive care- meclinzine, zofran prn  -monitor clinical symptoms. low threshold to obtain further neuro imaging (eg, MRI brain) if new neuro deficits or prolonged symptoms  -PT eval

## 2024-02-02 NOTE — H&P ADULT - ASSESSMENT
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

## 2024-02-02 NOTE — H&P ADULT - HISTORY OF PRESENT ILLNESS
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 Pt reports that started feeling dizzy with sensation of room spinning around her since 1 week ago. Symptoms are on and off, each episode lasts a few hours, associated with N/V, better with rest, worse when she moves her head/body, to the point that she feels losing balance on her feet and unable to walk. She denies headache, vision changes, ear pain/ringing, chest pain, palpitation. Denies new focal neurodeficits inc. dysphagia, dysarthria,  unilateral weakness, numbness, She reports hx of  dizziness d/t dehydration d/t diarrhea, but this time symptoms are much worse and last longer. Denies excessive diarrhea lately. No fever, chills, sick contact. No recent change of medications.    In ER, pt is afebrile, /80 HR 60 SPO2 100%. CTH, CTA H/N unremarkable.  Admitted for symptom management of vertigo     PAST MEDICAL & SURGICAL HISTORY:  Hypothyroidism      Hypercholesteremia      CA - Breast Cancer      History of Mastectomy      History of Mastectomy      History of Thyroidectomy          Review of Systems:   CONSTITUTIONAL: No fever, weight loss, or fatigue  EYES: No eye pain, visual disturbances, or discharge  ENMT:  No difficulty hearing, tinnitus. No sinus or throat pain  NECK: No pain or stiffness  RESPIRATORY: No cough, wheezing, chills or hemoptysis; No shortness of breath  CARDIOVASCULAR: No chest pain, palpitations, dizziness, or leg swelling  GASTROINTESTINAL: No abdominal or epigastric pain. No nausea, vomiting, or hematemesis; No diarrhea or constipation. No melena or hematochezia.  GENITOURINARY: No dysuria, frequency, hematuria, or incontinence  NEUROLOGICAL: No headaches, loss of strength, numbness, or tremors  SKIN: No itching, burning, rashes, or lesions   MUSCULOSKELETAL: No joint pain or swelling; No muscle, back, or extremity pain        Allergies    No Known Allergies    Intolerances        Social History:     Denies ETOH, illicit drug use, tobacco     FAMILY HISTORY:    non-contributory     MEDICATIONS  (STANDING):  apixaban 5 milliGRAM(s) Oral two times a day  levothyroxine 112 MICROGram(s) Oral daily  metoprolol succinate ER 25 milliGRAM(s) Oral daily  sacubitril 24 mG/valsartan 26 mG 1 Tablet(s) Oral two times a day  simvastatin 20 milliGRAM(s) Oral at bedtime    MEDICATIONS  (PRN):  acetaminophen     Tablet .. 650 milliGRAM(s) Oral every 6 hours PRN Temp greater or equal to 38C (100.4F), Mild Pain (1 - 3)  aluminum hydroxide/magnesium hydroxide/simethicone Suspension 30 milliLiter(s) Oral every 4 hours PRN Dyspepsia  meclizine 12.5 milliGRAM(s) Oral every 6 hours PRN Dizziness  melatonin 3 milliGRAM(s) Oral at bedtime PRN Insomnia  ondansetron Injectable 4 milliGRAM(s) IV Push every 8 hours PRN Nausea and/or Vomiting      T(C): 36.7 (02-02-24 @ 15:45), Max: 36.7 (02-02-24 @ 15:45)  HR: 60 (02-02-24 @ 15:45) (60 - 66)  BP: 118/71 (02-02-24 @ 15:45) (118/71 - 149/79)  RR: 16 (02-02-24 @ 15:45) (16 - 18)  SpO2: 99% (02-02-24 @ 15:45) (94% - 100%)    CAPILLARY BLOOD GLUCOSE        I&O's Summary      PHYSICAL EXAM:  GENERAL: NAD, well-developed  HEAD:  Atraumatic, Normocephalic  EYES: EOMI, PERRLA, conjunctiva and sclera clear. no nystagmus   NECK: Supple, No elevated JVD  CHEST/LUNG: Clear to auscultation bilaterally; No wheeze  HEART: Regular rate and rhythm; No murmurs, rubs, or gallops  ABDOMEN: Soft, Nontender, Nondistended; Bowel sounds present  EXTREMITIES:  2+ Peripheral Pulses, No clubbing, cyanosis, or edema  PSYCH: AAOx3  NEUROLOGY: CN II-XII grossly intact, moving all extremities. unable to assess gait d/t dizziness   SKIN: No rashes or lesions    LABS:                        13.7   5.84  )-----------( 178      ( 02 Feb 2024 11:26 )             40.9     02-02    140  |  105  |  14  ----------------------------<  98  4.3   |  21<L>  |  0.75    Ca    9.7      02 Feb 2024 11:26    TPro  7.2  /  Alb  3.8  /  TBili  2.2<H>  /  DBili  x   /  AST  17  /  ALT  11  /  AlkPhos  45  02-02    PT/INR - ( 02 Feb 2024 11:26 )   PT: 15.0 sec;   INR: 1.34 ratio         PTT - ( 02 Feb 2024 11:26 )  PTT:34.6 sec      Urinalysis Basic - ( 02 Feb 2024 11:26 )    Color: x / Appearance: x / SG: x / pH: x  Gluc: 98 mg/dL / Ketone: x  / Bili: x / Urobili: x   Blood: x / Protein: x / Nitrite: x   Leuk Esterase: x / RBC: x / WBC x   Sq Epi: x / Non Sq Epi: x / Bacteria: x

## 2024-02-02 NOTE — ED PROVIDER NOTE - PRO INTERPRETER NEED 2
07/22/2021 RUQ tenderness with guarding, gallstones on US today, with 0% EF onHIDA 2 weeks ago, normal LFTS  Laparoscopic cholecystectomy today per dr donovan risks benefits explained per dr donovan   English

## 2024-02-02 NOTE — ED ADULT TRIAGE NOTE - CHIEF COMPLAINT QUOTE
Patient brought to ER by EMS from home for dizziness times three days. When she stands she feels the room spinning.

## 2024-02-02 NOTE — ED PROVIDER NOTE - PHYSICAL EXAMINATION
GENERAL: +lethargic. diaphoretic, covered in liquid stool  HEENT: +oral mucosa dry,  no JVD  CARDIAC: regular rate and rhythm, normal S1S2, no appreciable murmurs,   PULM: normal breath sounds, clear to ascultation bilaterally, no rales, rhonchi, wheezing  GI: Abd soft, nondistended, nontender, no rebound tenderness, no guarding, no rigidity  : no CVA tenderness b/l, no suprapubic tenderness  NEURO: no focal motor or sensory deficits, CN2-12 intact, normal speech, PERRLA, EOMI, normal gait, AAOx3  MSK: +b/l UE contracted ROM intact, no peripheral edema, no calf tenderness b/l  SKIN: well-perfused, extremities warm, no visible rashes  PSYCH: appropriate mood and affect GENERAL: well appearing in no acute distress, non-toxic appearing  HEAD: normocephalic, atraumatic  HEENT: normal conjunctiva, oral mucosa moist, uvula midline, no tonsilar exudates, no JVD  CARDIAC: regular rate and rhythm, normal S1S2, no appreciable murmurs, 2+ pulses in UE/LE b/l  PULM: normal breath sounds, clear to ascultation bilaterally, no rales, rhonchi, wheezing  GI: Abd soft, nondistended, nontender, no rebound tenderness, no guarding, no rigidity  : no CVA tenderness b/l, no suprapubic tenderness  NEURO: no focal motor or sensory deficits, CN2-12 intact, normal speech, PERRLA, EOMI, normal gait, AAOx3  MSK: ROM intact, no peripheral edema, no calf tenderness b/l  SKIN: well-perfused, extremities warm, no visible rashes  PSYCH: appropriate mood and affect

## 2024-02-03 LAB
A1C WITH ESTIMATED AVERAGE GLUCOSE RESULT: 5.6 % — SIGNIFICANT CHANGE UP (ref 4–5.6)
ANION GAP SERPL CALC-SCNC: 12 MMOL/L — SIGNIFICANT CHANGE UP (ref 7–14)
BASOPHILS # BLD AUTO: 0.02 K/UL — SIGNIFICANT CHANGE UP (ref 0–0.2)
BASOPHILS NFR BLD AUTO: 0.3 % — SIGNIFICANT CHANGE UP (ref 0–2)
BUN SERPL-MCNC: 17 MG/DL — SIGNIFICANT CHANGE UP (ref 7–23)
CALCIUM SERPL-MCNC: 9.9 MG/DL — SIGNIFICANT CHANGE UP (ref 8.4–10.5)
CHLORIDE SERPL-SCNC: 103 MMOL/L — SIGNIFICANT CHANGE UP (ref 98–107)
CO2 SERPL-SCNC: 23 MMOL/L — SIGNIFICANT CHANGE UP (ref 22–31)
CREAT SERPL-MCNC: 0.76 MG/DL — SIGNIFICANT CHANGE UP (ref 0.5–1.3)
CRP SERPL-MCNC: <3 MG/L — SIGNIFICANT CHANGE UP
EGFR: 73 ML/MIN/1.73M2 — SIGNIFICANT CHANGE UP
EOSINOPHIL # BLD AUTO: 0.06 K/UL — SIGNIFICANT CHANGE UP (ref 0–0.5)
EOSINOPHIL NFR BLD AUTO: 1 % — SIGNIFICANT CHANGE UP (ref 0–6)
ERYTHROCYTE [SEDIMENTATION RATE] IN BLOOD: 21 MM/HR — SIGNIFICANT CHANGE UP (ref 4–25)
ESTIMATED AVERAGE GLUCOSE: 114 — SIGNIFICANT CHANGE UP
GLUCOSE SERPL-MCNC: 110 MG/DL — HIGH (ref 70–99)
HCT VFR BLD CALC: 39.4 % — SIGNIFICANT CHANGE UP (ref 34.5–45)
HGB BLD-MCNC: 13.1 G/DL — SIGNIFICANT CHANGE UP (ref 11.5–15.5)
IANC: 3.86 K/UL — SIGNIFICANT CHANGE UP (ref 1.8–7.4)
IMM GRANULOCYTES NFR BLD AUTO: 0.3 % — SIGNIFICANT CHANGE UP (ref 0–0.9)
LYMPHOCYTES # BLD AUTO: 1.38 K/UL — SIGNIFICANT CHANGE UP (ref 1–3.3)
LYMPHOCYTES # BLD AUTO: 23 % — SIGNIFICANT CHANGE UP (ref 13–44)
MCHC RBC-ENTMCNC: 31.5 PG — SIGNIFICANT CHANGE UP (ref 27–34)
MCHC RBC-ENTMCNC: 33.2 GM/DL — SIGNIFICANT CHANGE UP (ref 32–36)
MCV RBC AUTO: 94.7 FL — SIGNIFICANT CHANGE UP (ref 80–100)
MONOCYTES # BLD AUTO: 0.67 K/UL — SIGNIFICANT CHANGE UP (ref 0–0.9)
MONOCYTES NFR BLD AUTO: 11.1 % — SIGNIFICANT CHANGE UP (ref 2–14)
NEUTROPHILS # BLD AUTO: 3.86 K/UL — SIGNIFICANT CHANGE UP (ref 1.8–7.4)
NEUTROPHILS NFR BLD AUTO: 64.3 % — SIGNIFICANT CHANGE UP (ref 43–77)
NRBC # BLD: 0 /100 WBCS — SIGNIFICANT CHANGE UP (ref 0–0)
NRBC # FLD: 0 K/UL — SIGNIFICANT CHANGE UP (ref 0–0)
PLATELET # BLD AUTO: 194 K/UL — SIGNIFICANT CHANGE UP (ref 150–400)
POTASSIUM SERPL-MCNC: 4 MMOL/L — SIGNIFICANT CHANGE UP (ref 3.5–5.3)
POTASSIUM SERPL-SCNC: 4 MMOL/L — SIGNIFICANT CHANGE UP (ref 3.5–5.3)
RBC # BLD: 4.16 M/UL — SIGNIFICANT CHANGE UP (ref 3.8–5.2)
RBC # FLD: 14.6 % — HIGH (ref 10.3–14.5)
SODIUM SERPL-SCNC: 138 MMOL/L — SIGNIFICANT CHANGE UP (ref 135–145)
TSH SERPL-MCNC: 4.54 UIU/ML — HIGH (ref 0.27–4.2)
WBC # BLD: 6.01 K/UL — SIGNIFICANT CHANGE UP (ref 3.8–10.5)
WBC # FLD AUTO: 6.01 K/UL — SIGNIFICANT CHANGE UP (ref 3.8–10.5)

## 2024-02-03 PROCEDURE — 93010 ELECTROCARDIOGRAM REPORT: CPT

## 2024-02-03 RX ORDER — MECLIZINE HCL 12.5 MG
12.5 TABLET ORAL EVERY 6 HOURS
Refills: 0 | Status: COMPLETED | OUTPATIENT
Start: 2024-02-03 | End: 2024-02-04

## 2024-02-03 RX ORDER — MECLIZINE HCL 12.5 MG
12.5 TABLET ORAL EVERY 6 HOURS
Refills: 0 | Status: DISCONTINUED | OUTPATIENT
Start: 2024-02-04 | End: 2024-02-07

## 2024-02-03 RX ORDER — KETOROLAC TROMETHAMINE 30 MG/ML
15 SYRINGE (ML) INJECTION ONCE
Refills: 0 | Status: DISCONTINUED | OUTPATIENT
Start: 2024-02-03 | End: 2024-02-03

## 2024-02-03 RX ORDER — ACETAMINOPHEN 500 MG
1000 TABLET ORAL ONCE
Refills: 0 | Status: COMPLETED | OUTPATIENT
Start: 2024-02-03 | End: 2024-02-03

## 2024-02-03 RX ADMIN — SIMVASTATIN 20 MILLIGRAM(S): 20 TABLET, FILM COATED ORAL at 21:53

## 2024-02-03 RX ADMIN — Medication 650 MILLIGRAM(S): at 07:16

## 2024-02-03 RX ADMIN — Medication 650 MILLIGRAM(S): at 06:47

## 2024-02-03 RX ADMIN — Medication 112 MICROGRAM(S): at 05:37

## 2024-02-03 RX ADMIN — SACUBITRIL AND VALSARTAN 1 TABLET(S): 24; 26 TABLET, FILM COATED ORAL at 12:33

## 2024-02-03 RX ADMIN — Medication 15 MILLIGRAM(S): at 22:51

## 2024-02-03 RX ADMIN — SACUBITRIL AND VALSARTAN 1 TABLET(S): 24; 26 TABLET, FILM COATED ORAL at 21:53

## 2024-02-03 RX ADMIN — Medication 25 MILLIGRAM(S): at 05:38

## 2024-02-03 RX ADMIN — Medication 650 MILLIGRAM(S): at 16:52

## 2024-02-03 RX ADMIN — Medication 650 MILLIGRAM(S): at 15:51

## 2024-02-03 RX ADMIN — APIXABAN 5 MILLIGRAM(S): 2.5 TABLET, FILM COATED ORAL at 17:56

## 2024-02-03 RX ADMIN — APIXABAN 5 MILLIGRAM(S): 2.5 TABLET, FILM COATED ORAL at 05:38

## 2024-02-03 RX ADMIN — Medication 12.5 MILLIGRAM(S): at 23:02

## 2024-02-03 RX ADMIN — Medication 400 MILLIGRAM(S): at 23:19

## 2024-02-03 RX ADMIN — Medication 12.5 MILLIGRAM(S): at 17:56

## 2024-02-03 RX ADMIN — Medication 15 MILLIGRAM(S): at 21:51

## 2024-02-03 NOTE — PHYSICAL THERAPY INITIAL EVALUATION ADULT - ADDITIONAL COMMENTS
Pt left semisupine in bed in NAD, all lines intact, call bell in reach, son at bedside, SPO2 99%, bed alarm on, and RN Maren aware.

## 2024-02-03 NOTE — PHYSICAL THERAPY INITIAL EVALUATION ADULT - ACTIVE RANGE OF MOTION EXAMINATION, REHAB EVAL
peri. upper extremity Active ROM was WNL (within normal limits)/bilateral lower extremity Active ROM was WNL (within normal limits) Rinvoq Counseling: I discussed with the patient the risks of Rinvoq therapy including but not limited to upper respiratory tract infections, shingles, cold sores, bronchitis, nausea, cough, fever, acne, and headache. Live vaccines should be avoided.  This medication has been linked to serious infections; higher rate of mortality; malignancy and lymphoproliferative disorders; major adverse cardiovascular events; thrombosis; thrombocytopenia, anemia, and neutropenia; lipid elevations; liver enzyme elevations; and gastrointestinal perforations.

## 2024-02-03 NOTE — PHYSICAL THERAPY INITIAL EVALUATION ADULT - PERTINENT HX OF CURRENT PROBLEM, REHAB EVAL
Patient is a 89 year old Female, PMH stated below, presents with vertigo likely peripheral etiology.

## 2024-02-03 NOTE — PHYSICAL THERAPY INITIAL EVALUATION ADULT - NSPTDISCHREC_GEN_A_CORE
to optimize safety in the home environment and promote improvement of strength and functional deficits/Home PT to optimize safety in the home environment and promote improvement of strength and functional deficits. Provided with vestibular rehabilitation pamphlet./Home PT

## 2024-02-03 NOTE — PHYSICAL THERAPY INITIAL EVALUATION ADULT - NSPTDMEREC_GEN_A_CORE
The patient will require a rolling walker to be able to perform their MRADLs within their home./rolling walker

## 2024-02-03 NOTE — PHYSICAL THERAPY INITIAL EVALUATION ADULT - GENERAL OBSERVATIONS, REHAB EVAL
Acute decompensated HFrEF  ?Superimposed PNA  Afib  HTN  hx CVA, baseline aphasia    -trop (-) x3  -lasix 40mg BID IV  -c/w eliquis  -c/w metoprolol 50mg BID  -keep K>4 and Mg>2. Replete as needed  -elevated procal. Abx per primary team Pt encountered in semi-supine position in NAD, all lines intact, a&ox4, SPO2 99%, son at bedside, and RN Maren aware.

## 2024-02-03 NOTE — PATIENT PROFILE ADULT - FALL HARM RISK - RISK INTERVENTIONS
Assistance OOB with selected safe patient handling equipment/Assistance with ambulation/Communicate Fall Risk and Risk Factors to all staff, patient, and family/Discuss with provider need for PT consult/Monitor gait and stability/Provide patient with walking aids - walker, cane, crutches/Reinforce activity limits and safety measures with patient and family/Sit up slowly, dangle for a short time, stand at bedside before walking/Visual Cue: Yellow wristband/Bed in lowest position, wheels locked, appropriate side rails in place/Call bell, personal items and telephone in reach/Instruct patient to call for assistance before getting out of bed or chair/Non-slip footwear when patient is out of bed/[place] to call system/Physically safe environment - no spills, clutter or unnecessary equipment/Purposeful Proactive Rounding/Room/bathroom lighting operational, light cord in reach

## 2024-02-03 NOTE — CHART NOTE - NSCHARTNOTEFT_GEN_A_CORE
Overnight Medicine ACP Coverage    Notified by RN patient complaining of chest pain.     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 Pt now with left sided CP. Patient described the pain as sharp. No aggravating factors. Denies radiation of pain. Chest pain has improved with initial toradol ordered.  Patient stated that the chest pain has been present for several months but has been worse over the past two weeks. States she has taken Tylenol in the past for relief.     Denies Chills, N/V, HA, SOB, palpitations, cough, diaphoresis, sore throat, abd pain, diarrhea, dysuria, numbness, weakness, rashes.    T(C): 36.3 (02-03-24 @ 20:11), Max: 36.8 (02-03-24 @ 05:38)  HR: 65 (02-03-24 @ 21:49) (65 - 78)  BP: 147/79 (02-03-24 @ 21:49) (109/91 - 151/87)  RR: 18 (02-03-24 @ 20:11) (18 - 18)  SpO2: 100% (02-03-24 @ 20:11) (98% - 100%)    Physical Exam:  Gen: NAD  CV: irregularly irregular, normal S1 + S2, no m/r/g. No TTP  Lungs: CTAB  Abd: soft, non-tender  Ext: No edema    A/P    1. Chest pain, chronic in nature. Possibly MSK. R/O acs  -EKG with Afib, no ST elevations  -CE negative  -Toradol 15mg x 1, IV Acetaminophen 1000mg ordered for pain control  -Patient pending echo as part of vertigo work up      Will continue to monitor overnight    Flako Hairston PA-C  Department of Medicine  [facility]   In House Page [phone]

## 2024-02-04 LAB
ANION GAP SERPL CALC-SCNC: 12 MMOL/L — SIGNIFICANT CHANGE UP (ref 7–14)
ANION GAP SERPL CALC-SCNC: 16 MMOL/L — HIGH (ref 7–14)
BASOPHILS # BLD AUTO: 0.02 K/UL — SIGNIFICANT CHANGE UP (ref 0–0.2)
BASOPHILS NFR BLD AUTO: 0.4 % — SIGNIFICANT CHANGE UP (ref 0–2)
BUN SERPL-MCNC: 15 MG/DL — SIGNIFICANT CHANGE UP (ref 7–23)
BUN SERPL-MCNC: 18 MG/DL — SIGNIFICANT CHANGE UP (ref 7–23)
CALCIUM SERPL-MCNC: 9.3 MG/DL — SIGNIFICANT CHANGE UP (ref 8.4–10.5)
CALCIUM SERPL-MCNC: 9.4 MG/DL — SIGNIFICANT CHANGE UP (ref 8.4–10.5)
CALCIUM SERPL-MCNC: 9.5 MG/DL — SIGNIFICANT CHANGE UP (ref 8.4–10.5)
CALCIUM SERPL-MCNC: 9.7 MG/DL — SIGNIFICANT CHANGE UP (ref 8.4–10.5)
CHLORIDE SERPL-SCNC: 101 MMOL/L — SIGNIFICANT CHANGE UP (ref 98–107)
CHLORIDE SERPL-SCNC: 102 MMOL/L — SIGNIFICANT CHANGE UP (ref 98–107)
CHLORIDE SERPL-SCNC: 102 MMOL/L — SIGNIFICANT CHANGE UP (ref 98–107)
CHLORIDE SERPL-SCNC: 103 MMOL/L — SIGNIFICANT CHANGE UP (ref 98–107)
CK MB CFR SERPL CALC: 2.4 NG/ML — SIGNIFICANT CHANGE UP
CO2 SERPL-SCNC: 17 MMOL/L — LOW (ref 22–31)
CO2 SERPL-SCNC: 20 MMOL/L — LOW (ref 22–31)
CO2 SERPL-SCNC: 21 MMOL/L — LOW (ref 22–31)
CO2 SERPL-SCNC: 22 MMOL/L — SIGNIFICANT CHANGE UP (ref 22–31)
CREAT SERPL-MCNC: 0.64 MG/DL — SIGNIFICANT CHANGE UP (ref 0.5–1.3)
CREAT SERPL-MCNC: 0.66 MG/DL — SIGNIFICANT CHANGE UP (ref 0.5–1.3)
CREAT SERPL-MCNC: 0.71 MG/DL — SIGNIFICANT CHANGE UP (ref 0.5–1.3)
CREAT SERPL-MCNC: 0.86 MG/DL — SIGNIFICANT CHANGE UP (ref 0.5–1.3)
EGFR: 63 ML/MIN/1.73M2 — SIGNIFICANT CHANGE UP
EGFR: 79 ML/MIN/1.73M2 — SIGNIFICANT CHANGE UP
EGFR: 82 ML/MIN/1.73M2 — SIGNIFICANT CHANGE UP
EGFR: 82 ML/MIN/1.73M2 — SIGNIFICANT CHANGE UP
EOSINOPHIL # BLD AUTO: 0.03 K/UL — SIGNIFICANT CHANGE UP (ref 0–0.5)
EOSINOPHIL NFR BLD AUTO: 0.6 % — SIGNIFICANT CHANGE UP (ref 0–6)
GLUCOSE SERPL-MCNC: 107 MG/DL — HIGH (ref 70–99)
GLUCOSE SERPL-MCNC: 117 MG/DL — HIGH (ref 70–99)
GLUCOSE SERPL-MCNC: 119 MG/DL — HIGH (ref 70–99)
GLUCOSE SERPL-MCNC: 89 MG/DL — SIGNIFICANT CHANGE UP (ref 70–99)
HCT VFR BLD CALC: 37.3 % — SIGNIFICANT CHANGE UP (ref 34.5–45)
HCT VFR BLD CALC: 37.8 % — SIGNIFICANT CHANGE UP (ref 34.5–45)
HCT VFR BLD CALC: 39.2 % — SIGNIFICANT CHANGE UP (ref 34.5–45)
HGB BLD-MCNC: 12.8 G/DL — SIGNIFICANT CHANGE UP (ref 11.5–15.5)
HGB BLD-MCNC: 12.9 G/DL — SIGNIFICANT CHANGE UP (ref 11.5–15.5)
HGB BLD-MCNC: 13 G/DL — SIGNIFICANT CHANGE UP (ref 11.5–15.5)
IANC: 3.39 K/UL — SIGNIFICANT CHANGE UP (ref 1.8–7.4)
IMM GRANULOCYTES NFR BLD AUTO: 0.2 % — SIGNIFICANT CHANGE UP (ref 0–0.9)
LYMPHOCYTES # BLD AUTO: 1.09 K/UL — SIGNIFICANT CHANGE UP (ref 1–3.3)
LYMPHOCYTES # BLD AUTO: 21.8 % — SIGNIFICANT CHANGE UP (ref 13–44)
MAGNESIUM SERPL-MCNC: 1.7 MG/DL — SIGNIFICANT CHANGE UP (ref 1.6–2.6)
MAGNESIUM SERPL-MCNC: 1.7 MG/DL — SIGNIFICANT CHANGE UP (ref 1.6–2.6)
MCHC RBC-ENTMCNC: 31.9 PG — SIGNIFICANT CHANGE UP (ref 27–34)
MCHC RBC-ENTMCNC: 32.4 PG — SIGNIFICANT CHANGE UP (ref 27–34)
MCHC RBC-ENTMCNC: 32.4 PG — SIGNIFICANT CHANGE UP (ref 27–34)
MCHC RBC-ENTMCNC: 33.2 GM/DL — SIGNIFICANT CHANGE UP (ref 32–36)
MCHC RBC-ENTMCNC: 33.9 GM/DL — SIGNIFICANT CHANGE UP (ref 32–36)
MCHC RBC-ENTMCNC: 34.6 GM/DL — SIGNIFICANT CHANGE UP (ref 32–36)
MCV RBC AUTO: 93.7 FL — SIGNIFICANT CHANGE UP (ref 80–100)
MCV RBC AUTO: 95.7 FL — SIGNIFICANT CHANGE UP (ref 80–100)
MCV RBC AUTO: 96.3 FL — SIGNIFICANT CHANGE UP (ref 80–100)
MONOCYTES # BLD AUTO: 0.47 K/UL — SIGNIFICANT CHANGE UP (ref 0–0.9)
MONOCYTES NFR BLD AUTO: 9.4 % — SIGNIFICANT CHANGE UP (ref 2–14)
NEUTROPHILS # BLD AUTO: 3.39 K/UL — SIGNIFICANT CHANGE UP (ref 1.8–7.4)
NEUTROPHILS NFR BLD AUTO: 67.6 % — SIGNIFICANT CHANGE UP (ref 43–77)
NRBC # BLD: 0 /100 WBCS — SIGNIFICANT CHANGE UP (ref 0–0)
NRBC # FLD: 0 K/UL — SIGNIFICANT CHANGE UP (ref 0–0)
PHOSPHATE SERPL-MCNC: 3.3 MG/DL — SIGNIFICANT CHANGE UP (ref 2.5–4.5)
PHOSPHATE SERPL-MCNC: 3.5 MG/DL — SIGNIFICANT CHANGE UP (ref 2.5–4.5)
PLATELET # BLD AUTO: 160 K/UL — SIGNIFICANT CHANGE UP (ref 150–400)
PLATELET # BLD AUTO: 169 K/UL — SIGNIFICANT CHANGE UP (ref 150–400)
PLATELET # BLD AUTO: 173 K/UL — SIGNIFICANT CHANGE UP (ref 150–400)
POTASSIUM SERPL-MCNC: 3.8 MMOL/L — SIGNIFICANT CHANGE UP (ref 3.5–5.3)
POTASSIUM SERPL-MCNC: 4.2 MMOL/L — SIGNIFICANT CHANGE UP (ref 3.5–5.3)
POTASSIUM SERPL-MCNC: 4.3 MMOL/L — SIGNIFICANT CHANGE UP (ref 3.5–5.3)
POTASSIUM SERPL-MCNC: 5.5 MMOL/L — HIGH (ref 3.5–5.3)
POTASSIUM SERPL-SCNC: 3.8 MMOL/L — SIGNIFICANT CHANGE UP (ref 3.5–5.3)
POTASSIUM SERPL-SCNC: 4.2 MMOL/L — SIGNIFICANT CHANGE UP (ref 3.5–5.3)
POTASSIUM SERPL-SCNC: 4.3 MMOL/L — SIGNIFICANT CHANGE UP (ref 3.5–5.3)
POTASSIUM SERPL-SCNC: 5.5 MMOL/L — HIGH (ref 3.5–5.3)
RBC # BLD: 3.95 M/UL — SIGNIFICANT CHANGE UP (ref 3.8–5.2)
RBC # BLD: 3.98 M/UL — SIGNIFICANT CHANGE UP (ref 3.8–5.2)
RBC # BLD: 4.07 M/UL — SIGNIFICANT CHANGE UP (ref 3.8–5.2)
RBC # FLD: 13.9 % — SIGNIFICANT CHANGE UP (ref 10.3–14.5)
RBC # FLD: 14.3 % — SIGNIFICANT CHANGE UP (ref 10.3–14.5)
RBC # FLD: 14.5 % — SIGNIFICANT CHANGE UP (ref 10.3–14.5)
SODIUM SERPL-SCNC: 134 MMOL/L — LOW (ref 135–145)
SODIUM SERPL-SCNC: 134 MMOL/L — LOW (ref 135–145)
SODIUM SERPL-SCNC: 135 MMOL/L — SIGNIFICANT CHANGE UP (ref 135–145)
SODIUM SERPL-SCNC: 137 MMOL/L — SIGNIFICANT CHANGE UP (ref 135–145)
T4 FREE SERPL-MCNC: 1.6 NG/DL — SIGNIFICANT CHANGE UP (ref 0.9–1.8)
TROPONIN T, HIGH SENSITIVITY RESULT: 12 NG/L — SIGNIFICANT CHANGE UP
TROPONIN T, HIGH SENSITIVITY RESULT: 16 NG/L — SIGNIFICANT CHANGE UP
VIT B12 SERPL-MCNC: 163 PG/ML — LOW (ref 200–900)
WBC # BLD: 4.71 K/UL — SIGNIFICANT CHANGE UP (ref 3.8–10.5)
WBC # BLD: 5.01 K/UL — SIGNIFICANT CHANGE UP (ref 3.8–10.5)
WBC # BLD: 5.48 K/UL — SIGNIFICANT CHANGE UP (ref 3.8–10.5)
WBC # FLD AUTO: 4.71 K/UL — SIGNIFICANT CHANGE UP (ref 3.8–10.5)
WBC # FLD AUTO: 5.01 K/UL — SIGNIFICANT CHANGE UP (ref 3.8–10.5)
WBC # FLD AUTO: 5.48 K/UL — SIGNIFICANT CHANGE UP (ref 3.8–10.5)

## 2024-02-04 PROCEDURE — 93010 ELECTROCARDIOGRAM REPORT: CPT

## 2024-02-04 RX ORDER — ACETAMINOPHEN 500 MG
1000 TABLET ORAL ONCE
Refills: 0 | Status: COMPLETED | OUTPATIENT
Start: 2024-02-04 | End: 2024-02-04

## 2024-02-04 RX ORDER — KETOROLAC TROMETHAMINE 30 MG/ML
15 SYRINGE (ML) INJECTION ONCE
Refills: 0 | Status: DISCONTINUED | OUTPATIENT
Start: 2024-02-04 | End: 2024-02-04

## 2024-02-04 RX ORDER — PANTOPRAZOLE SODIUM 20 MG/1
40 TABLET, DELAYED RELEASE ORAL ONCE
Refills: 0 | Status: COMPLETED | OUTPATIENT
Start: 2024-02-04 | End: 2024-02-04

## 2024-02-04 RX ORDER — PANTOPRAZOLE SODIUM 20 MG/1
40 TABLET, DELAYED RELEASE ORAL
Refills: 0 | Status: DISCONTINUED | OUTPATIENT
Start: 2024-02-05 | End: 2024-02-07

## 2024-02-04 RX ADMIN — SACUBITRIL AND VALSARTAN 1 TABLET(S): 24; 26 TABLET, FILM COATED ORAL at 05:29

## 2024-02-04 RX ADMIN — Medication 15 MILLIGRAM(S): at 22:24

## 2024-02-04 RX ADMIN — Medication 25 MILLIGRAM(S): at 05:28

## 2024-02-04 RX ADMIN — Medication 12.5 MILLIGRAM(S): at 05:28

## 2024-02-04 RX ADMIN — Medication 15 MILLIGRAM(S): at 21:24

## 2024-02-04 RX ADMIN — APIXABAN 5 MILLIGRAM(S): 2.5 TABLET, FILM COATED ORAL at 17:27

## 2024-02-04 RX ADMIN — Medication 112 MICROGRAM(S): at 05:28

## 2024-02-04 RX ADMIN — SACUBITRIL AND VALSARTAN 1 TABLET(S): 24; 26 TABLET, FILM COATED ORAL at 17:26

## 2024-02-04 RX ADMIN — PANTOPRAZOLE SODIUM 40 MILLIGRAM(S): 20 TABLET, DELAYED RELEASE ORAL at 21:24

## 2024-02-04 RX ADMIN — Medication 12.5 MILLIGRAM(S): at 14:19

## 2024-02-04 RX ADMIN — Medication 1000 MILLIGRAM(S): at 00:02

## 2024-02-04 RX ADMIN — SIMVASTATIN 20 MILLIGRAM(S): 20 TABLET, FILM COATED ORAL at 21:24

## 2024-02-04 RX ADMIN — APIXABAN 5 MILLIGRAM(S): 2.5 TABLET, FILM COATED ORAL at 05:28

## 2024-02-04 RX ADMIN — Medication 400 MILLIGRAM(S): at 17:27

## 2024-02-04 NOTE — CONSULT NOTE ADULT - ATTENDING COMMENTS
Ms. Rodriguez is a 89 year old right handed pleasant  woman with PMHx Afib on AC, Uterine and breast CA s/p hysterecomt,y RT, and mastectomy and chronic diarrhea  who presents to [facility] ED for dizziness. During my evaluation, patient confirmed that her dizziness is much better.   But patient endorses feeling imbalanced while standing up. Denies nausea/vomiting. Denies HA, neck pain, changes in vision, blurry vision, diplopia,, weakness, numbness or tingling, difficulty with speech. Denies hearing loss or tinnitus. Denies recent infection. At baseline patient is ambulatory and independent.    Detailed neuro exam:  General: Patient is comfortably lying on the bed without acute distress.  Mental and Psychological Status: The patient is alert and oriented to person, place and exact date. Follows simple and complex commands. Speech is fluent, comprehension, naming and repetition are intact. Relates personal medical history clearly and with detail.  Cranial Nerve Exam: Visual fields are full to confrontation. Pupils are round, equal, and reactive. Extraocular movements are full. V1-V3 are intact to light touch. There is no facial weakness or asymmetry. Hearing is grossly intact. Palate elevation is symmetric. Shoulder shrug is 5/5 bilaterally. Tongue protrusion is midline. There is no dysarthria.  Motor Exam: Bulk, tone, and strength are normal. There is no pronator drift. There are no resting tremors.  Sensory Examination: Sensation is intact to light touch throughout.  Deep Tendon Reflexes and Plantar Responses: 2+ throughout.  Posture, Station and Gait: Not tested due to the safety.  Coordination: There is no dysmetria or ataxia with finger-nose-finger or heel-knee-shin. No intention tremors are present.    A/P:                                                      Ms. Rodriguez is a 89 year old right handed pleasant  woman with PMHx Afib on AC, Uterine and breast CA s/p hysterecomt,y RT, and mastectomy and chronic diarrhea  who presents to [facility] ED for dizziness who presents to [facility] ED for dizziness. Etiology of dizziness is most likely due to the peripheral etiology. Clinically less suspicious for vascular etiology. MRI brain to rule out treatable etiologies.   Clinically her symptoms are significantly better.  Continue Meclizine   PT, OT and Vestibular rehab.  Please check the orthostatic BP if not performed.  Safety and fall precaution were discussed.  Continue medical management, neuro- check.  GI and DVT prophylaxis.  I discussed the diagnosis, and treatment plan with the patient.  All questions and concerns were addressed. The patient demonstrated good understanding of the treatment plan.  If you have any further questions, please do not hesitate to contact our team.  Thank you for allowing us to participate in this patient care.

## 2024-02-04 NOTE — CONSULT NOTE ADULT - ASSESSMENT
89W PMHx Afib on AC, Uterine and breast CA s/p hysterecomt,y RT, and mastectomy, chronic diarrhea and constipation p/w positional paroxysmal vertigo. Exam with right nystagmus/peripheral hints. 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 Orthostatics wnl.     Impression: Peripheral Vertigo, Favor BPPV     Recommendations  [] Continue meclizine 12.5mg q6 PRN for dizziness can trial 25mg  [] IV Fluids, can trial a bolus if not otherwise contraindicated and then maintenance fluids (note CT concern for pulmonary edema)   [] TTE  [] PT  [] Vestibular Rehab  [] ENT Referral, generally can be outpatient but family noting may have difficulty getting to appointment and requesting inpatient consult  [] Zofran PRN if not otherwise contraindicated   [] F/U SW recs re home help  [] Chest pain per primary team  [] Outpatient NSGY eval for acom aneurysm  [] Aneurysmal aorta per primary team     Case not yet d/w attending. To be seen and staffed 2/5/24. Please await attestation for final recs

## 2024-02-04 NOTE — CONSULT NOTE ADULT - SUBJECTIVE AND OBJECTIVE BOX
Neurology - Consult Note    -  Spectra: [phone] ([facility]), [phone] ([facility])  -    HPI: Patient GUILLE COX is a 89y (25-May-1930) woman with a PMHx significant for afib on apixaban hypothyroidism, uterine cancer s/p hysterectomy and RT 20 years ago, breast ca s/p mastectomy, chronic diarrhea and occasional constipation p/w vertigo. Notes several days of positional vertigo that improves with staying still and rest but is severe limiting mobility. Describes gait imbalance and room spinning sensation. Denies diplopia,. vision changes, hearing changes (notes chronic bad hearing), ear pain, ear fullness, tinnitus, pulsatile tinnitus, recent illness.    Hospital course: Symptoms not improving with 12.5mg meclizine. Orthostatics unrevealing. Since 2/3 pm has had pain from under left breastbone around back, denies sharpness, burning, aches, or any other descriptors noting just pain     Review of Systems:    +Left pain from under left breastbone around back, denies sharpness, burning, aches, or any other descriptors noting just pain   CONSTITUTIONAL: No fevers or chills  EYES AND ENT: No visual changes or no throat pain   NECK: No pain or stiffness  RESPIRATORY: No hemoptysis or shortness of breath  CARDIOVASCULAR: No chest pain or palpitations  GASTROINTESTINAL: No melena or hematochezia  GENITOURINARY: No dysuria or hematuria  NEUROLOGICAL: +As stated in HPI above  SKIN: No itching, burning, rashes, or lesions   All other review of systems is negative unless indicated above.    Allergies:  No Known Allergies      PMHx/PSHx/Family Hx: As above, otherwise see below   Hypothyroidism    Hypercholesteremia  CA - Breast Cancer        Social Hx:  retired special   lives alone  independent in adls  no cane no walker    Medications:  MEDICATIONS  (STANDING):  apixaban 5 milliGRAM(s) Oral two times a day  levothyroxine 112 MICROGram(s) Oral daily  metoprolol succinate ER 25 milliGRAM(s) Oral daily  sacubitril 24 mG/valsartan 26 mG 1 Tablet(s) Oral two times a day  simvastatin 20 milliGRAM(s) Oral at bedtime    MEDICATIONS  (PRN):  acetaminophen     Tablet .. 650 milliGRAM(s) Oral every 6 hours PRN Temp greater or equal to 38C (100.4F), Mild Pain (1 - 3)  aluminum hydroxide/magnesium hydroxide/simethicone Suspension 30 milliLiter(s) Oral every 4 hours PRN Dyspepsia  meclizine 12.5 milliGRAM(s) Oral every 6 hours PRN Dizziness  melatonin 3 milliGRAM(s) Oral at bedtime PRN Insomnia  ondansetron Injectable 4 milliGRAM(s) IV Push every 8 hours PRN Nausea and/or Vomiting      Vitals:  T(C): 36.2 (02-04-24 @ 13:29), Max: 36.9 (02-04-24 @ 05:26)  HR: 57 (02-04-24 @ 17:01) (57 - 68)  BP: 130/64 (02-04-24 @ 17:01) (105/84 - 147/79)  RR: 18 (02-04-24 @ 17:01) (18 - 18)  SpO2: 98% (02-04-24 @ 17:01) (97% - 100%)    Physical Examination:   General - NAD  Cardiovascular - Peripheral pulses palpable, no edema  Eyes -Fundoscopy not performed due to safety precautions in the setting of the COVID-19 pandemic    Neurologic Exam:  Mental status - Awake, Alert, Oriented to person, place, and time. Speech fluent, repetition and naming intact. Follows simple and complex commands.   Cranial nerves - PERRL, VFF, EOMI  face sensation (V1-V3) intact b/l, facial strength intact without asymmetry b/l, hearing intact b/l, palate with symmetric elevation, trapezius /5 strength b/l, tongue midline on protrusion with full lateral movement  HINTS:  HI: Corrective saccades  N: right beating nystagmus worse on right gaze  S: none     Motor - Normal bulk and tone throughout. No pronator drift.  Strength testing            Deltoid      Biceps      Triceps           R            5                 5               5                     5                            L             5                 5               5                     5                                   Hip Flexion       Dorsiflexion    Plantar Flexion  R              5                           5                       5                    L              5                           5                        5                            Sensation - Light touch intact throughout    DTR's -             Biceps      Triceps     Brachioradialis      Patellar    Ankle    Toes/plantar response  R             1+             2+                  1+               0+            0+                 mute  L              1+             2+                 1+                 0+           0+                 mute    Coordination - Finger to Nose intact b/l. iuntact alternative hand motion.     Gait and station - Deferred due to vertigo and fall risk     Labs:                        12.8   5.01  )-----------( 169      ( 04 Feb 2024 06:00 )             37.8     02-04    134<L>  |  102  |  15  ----------------------------<  107<H>  3.8   |  20<L>  |  0.64    Ca    9.3      04 Feb 2024 06:39  Phos  3.3     02-03  Mg     1.70     02-03      CAPILLARY BLOOD GLUCOSE              CSF:                  Radiology:  CT Head No Cont:  (02 Feb 2024 12:37)  < from: CT Angio Head w/ IV Cont (02.02.24 @ 12:39) >    ACC: [id number] EXAM:  CT BRAIN   ORDERED BY: DONAVON RUDOLPH     ACC: [id number] EXAM:  CT ANGIO NECK (W)AW IC   ORDERED BY: DONAVON RUDOLPH     ACC: [id number] EXAM:  CT ANGIO BRAIN (W)AW IC   ORDERED BY: DONAVON RUDOLPH     PROCEDURE DATE:  02/02/2024          INTERPRETATION:  CLINICAL INDICATION: rule out stroke.    TECHNIQUE: CT of the head was performed with multiplanar reformats,   without IV contrast. CTA of the head and neck was performed after the   intravenous administration of contrast. 3D MIP reconstructions were   performed on a separate workstation and reviewed.  IV Contrast: Omnipaque 350 (accession [id number]), IV contrast documented   in unlinked concurrent exam (accession [id number])  90 cc administered    COMPARISON: None    FINDINGS:  CT HEAD:  No acute transcortical infarction or acute intracranial hemorrhage.  No hydrocephalus. No extra-axial fluid collections.  The visualized intraorbital contents are normal. Mucous retention cyst   versus polyp in the left maxillary sinus. The mastoid air cells are   clear. The visualized soft tissues and osseous structures appear normal.    CTA NECK:  Aneurysmal aorta. Ascending aorta measures proximally 4 cm. Ascending   aorta measures 3 cm. The left vertebral artery arises directly from the  aortic arch. The origins of the great vessels and the vertebral arteries   are patent without evidence of stenosis.    Bilateral common carotid arteries are patent.  Bilateral cervical internal carotid arteries are patent. Minimal   calcified plaque in the carotid bifurcations.  Bilateral vertebral arteries are patent.    Biapical pleural-parenchymal scarring. Patchy groundglass opacity in a   perihilar distribution upper lung suggestive of pulmonary edema.    CTA HEAD:    Bilateral intracranial internal carotid arteries are patent.  The proximal anterior, middle and posterior cerebral arteries are patent   bilaterally.  Patent vertebrobasilar system.    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    IMPRESSION:  CT head:  1.  No acute findings.    CT angiogram head:  1. 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  2. No proximal large vessel occlusions or high-grade vascular stenoses.    CT angiogram neck:  1.  No high-grade stenoses or dissections. Widely patent anterior and   posterior circulation. Minimal calcified plaque in the carotids.    2.  Aneurysmal aorta approximately 4 cm diameter of the ascending aorta   and 3 cm diameter descending aorta.    _____________  NASCET criteria for internal carotid artery stenosis:  Mild: 0% to 49%  Moderate: 50% to 69%  Severe: 70% to 99%  Complete Occlusion    --- End of Report ---            JESSICA EVANS MD; Attending Radiologist  This document has been electronically signed. Feb 2 2024  1:46PM    < end of copied text >

## 2024-02-04 NOTE — CHART NOTE - NSCHARTNOTEFT_GEN_A_CORE
Notified by RN patient had a complaint of chest pain. Saw patient at bedside in notable discomfort. Patient states pain is a 10 Notified by RN patient had a complaint of chest pain. Saw patient at bedside in notable discomfort. Patient states pain is a 10/10 pain that has been occurring for the past 3 days which radiates from the back around the left chest to the front. There are no exacerbating factors and patient has only found relieve with the IV tylenol which was given last night for the same complaint. Patient denies any chills, cough, SOB, heart palpitations, changes in vision, diaphoresis, headaches, nausea, or vomiting.     T(C): 36.2 (02-04-24 @ 13:29), Max: 36.9 (02-04-24 @ 05:26)  HR: 57 (02-04-24 @ 17:01) (57 - 68)  BP: 130/64 (02-04-24 @ 17:01) (105/84 - 147/79)  RR: 18 (02-04-24 @ 17:01) (18 - 18)  SpO2: 98% (02-04-24 @ 17:01) (97% - 100%)    CONSTITUTIONAL: Well groomed, moderate distress  RESP: No respiratory distress, no use of accessory muscles; CTA b/l, no WRR  CV: irregularly irregular, +S1S2, no MRG; no JVD; no peripheral edema  GI: Soft, NT, ND, no rebound, no guarding; no palpable masses; no hepatosplenomegaly; no hernia palpated  MSK: No digital clubbing or cyanosis, normal ROM without pain, no spinal tenderness, normal muscle strength/tone, no pain to palpation of the area described by the patient above  SKIN: No rashes or ulcers noted; no subcutaneous nodules or induration palpable    CP, possibly MSK, r/o acs vs PE vs AAA    Discussed with attending. Ordered CBC, BMP, Troponin, CKMB, and D-Dimer. Ordered Ofirmev 1000mg IV x1. Family at this time has deferred CTA Aorta with and without IV contrast. Notified by RN patient had a complaint of chest pain. Saw patient at bedside in notable discomfort. Patient states pain is a 10/10 pain that has been occurring for the past 3 days which radiates from the back around the left chest to the front. There are no exacerbating factors and patient has only found relieve with the IV tylenol which was given last night for the same complaint. Patient denies any chills, cough, SOB, heart palpitations, changes in vision, diaphoresis, headaches, nausea, or vomiting.     T(C): 36.2 (02-04-24 @ 13:29), Max: 36.9 (02-04-24 @ 05:26)  HR: 57 (02-04-24 @ 17:01) (57 - 68)  BP: 130/64 (02-04-24 @ 17:01) (105/84 - 147/79)  Manual BP of Right arm 128/58  Manual BP of Left arm 132/ 62  RR: 18 (02-04-24 @ 17:01) (18 - 18)  SpO2: 98% (02-04-24 @ 17:01) (97% - 100%)    CONSTITUTIONAL: Well groomed, moderate distress  RESP: No respiratory distress, no use of accessory muscles; CTA b/l, no WRR  CV: irregularly irregular, +S1S2, no MRG; no JVD; no peripheral edema  GI: Soft, NT, ND, no rebound, no guarding; no palpable masses; no hepatosplenomegaly; no hernia palpated  MSK: No digital clubbing or cyanosis, normal ROM without pain, no spinal tenderness, normal muscle strength/tone, no pain to palpation of the area described by the patient above  SKIN: No rashes or ulcers noted; no subcutaneous nodules or induration palpable    CP, possibly MSK, r/o ACS vs PE    EKG w/ Atrial Fibrillation and w/o ST elevations or T wave inversions    Discussed with attending. Ordered CBC, BMP, Troponin, CKMB, and D-Dimer. Ordered Ofirmev 1000mg IV x1. Family and patient have deferred CTA Aorta with and without IV contrast at this time. Notified by RN patient had a complaint of chest pain. Saw patient at bedside in notable discomfort. Patient states pain is a 10/10 pain that has been occurring for the past 3 days which radiates from the back around the left chest to the front. There are no exacerbating factors and patient has only found relieve with the IV tylenol which was given last night for the same complaint. Patient denies any chills, cough, SOB, heart palpitations, changes in vision, diaphoresis, headaches, nausea, or vomiting.     T(C): 36.2 (02-04-24 @ 13:29), Max: 36.9 (02-04-24 @ 05:26)  HR: 57 (02-04-24 @ 17:01) (57 - 68)  BP: 130/64 (02-04-24 @ 17:01) (105/84 - 147/79)  Manual BP of Right arm 128/58  Manual BP of Left arm 132/ 62  RR: 18 (02-04-24 @ 17:01) (18 - 18)  SpO2: 98% (02-04-24 @ 17:01) (97% - 100%)    CONSTITUTIONAL: Well groomed, moderate distress  RESP: No respiratory distress, no use of accessory muscles; CTA b/l, no WRR  CV: irregularly irregular, +S1S2, no MRG; no JVD; no peripheral edema  GI: Soft, NT, ND, no rebound, no guarding; no palpable masses; no hepatosplenomegaly; no hernia palpated  MSK: No digital clubbing or cyanosis, normal ROM without pain, no spinal tenderness, normal muscle strength/tone, no pain to palpation of the area described by the patient above  SKIN: No rashes or ulcers noted; no subcutaneous nodules or induration palpable    CP, possibly MSK, r/o ACS vs PE vs GERD    EKG w/ Atrial Fibrillation and w/o ST elevations or T wave inversions    Discussed with attending. Ordered CBC, BMP, Troponin, CKMB, and D-Dimer. Ordered Ofirmev 1000mg IV x1 and Protonix 40mg QD. Family and patient have deferred CTA Aorta with and without IV contrast at this time. Notified by RN patient had a complaint of chest pain. Saw patient at bedside in notable discomfort. Patient states pain is a 10/10 pain that has been occurring for the past 3 days which radiates from the back around the left chest to the front. There are no exacerbating factors and patient has only found relieve with the IV tylenol which was given last night for the same complaint. Patient denies any chills, cough, SOB, heart palpitations, changes in vision, diaphoresis, headaches, nausea, or vomiting.     T(C): 36.2 (02-04-24 @ 13:29), Max: 36.9 (02-04-24 @ 05:26)  HR: 57 (02-04-24 @ 17:01) (57 - 68)  BP: 130/64 (02-04-24 @ 17:01) (105/84 - 147/79)  Manual BP of Right arm 128/58  Manual BP of Left arm 132/ 62  RR: 18 (02-04-24 @ 17:01) (18 - 18)  SpO2: 98% (02-04-24 @ 17:01) (97% - 100%)    CONSTITUTIONAL: Well groomed, moderate distress  RESP: No respiratory distress, no use of accessory muscles; CTA b/l, no WRR  CV: irregularly irregular, +S1S2, no MRG; no JVD; no peripheral edema  GI: Soft, NT, ND, no rebound, no guarding; no palpable masses; no hepatosplenomegaly; no hernia palpated  MSK: No digital clubbing or cyanosis, normal ROM without pain, no spinal tenderness, normal muscle strength/tone, no pain to palpation of the area described by the patient above  SKIN: No rashes or ulcers noted; no subcutaneous nodules or induration palpable    CP, possibly MSK, r/o ACS vs PE vs GERD    EKG w/ Atrial Fibrillation and w/o ST elevations or T wave inversions    Discussed with attending. Ordered CBC, BMP, Troponin, CKMB, and D-Dimer. Ordered Ofirmev 1000mg IV x1 and Protonix 40mg QD. Family and patient have deferred CTA Aorta with and without IV contrast at this time. Continue to monitor and follow up labs.

## 2024-02-04 NOTE — PROGRESS NOTE ADULT - TIME BILLING
- Review of records, telemetry, vital signs and daily labs.   - General and cardiovascular physical examination.  - Generation of cardiovascular treatment plan.  - Coordination of care.      Patient was seen and examined by me on 2/4/24,interim events noted,labs and radiology studies reviewed.  Kvng Ni MD,FACC.  [address] [place], [place] [id number].  [phone]

## 2024-02-05 PROCEDURE — 93306 TTE W/DOPPLER COMPLETE: CPT | Mod: 26

## 2024-02-05 PROCEDURE — 99223 1ST HOSP IP/OBS HIGH 75: CPT | Mod: GC

## 2024-02-05 RX ORDER — ACETAMINOPHEN 500 MG
1000 TABLET ORAL ONCE
Refills: 0 | Status: DISCONTINUED | OUTPATIENT
Start: 2024-02-05 | End: 2024-02-07

## 2024-02-05 RX ORDER — ACETAMINOPHEN 500 MG
1000 TABLET ORAL ONCE
Refills: 0 | Status: COMPLETED | OUTPATIENT
Start: 2024-02-05 | End: 2024-02-05

## 2024-02-05 RX ORDER — KETOROLAC TROMETHAMINE 30 MG/ML
15 SYRINGE (ML) INJECTION ONCE
Refills: 0 | Status: DISCONTINUED | OUTPATIENT
Start: 2024-02-05 | End: 2024-02-05

## 2024-02-05 RX ADMIN — APIXABAN 5 MILLIGRAM(S): 2.5 TABLET, FILM COATED ORAL at 07:24

## 2024-02-05 RX ADMIN — Medication 650 MILLIGRAM(S): at 18:48

## 2024-02-05 RX ADMIN — SIMVASTATIN 20 MILLIGRAM(S): 20 TABLET, FILM COATED ORAL at 22:21

## 2024-02-05 RX ADMIN — Medication 15 MILLIGRAM(S): at 19:59

## 2024-02-05 RX ADMIN — Medication 12.5 MILLIGRAM(S): at 11:41

## 2024-02-05 RX ADMIN — PANTOPRAZOLE SODIUM 40 MILLIGRAM(S): 20 TABLET, DELAYED RELEASE ORAL at 07:24

## 2024-02-05 RX ADMIN — Medication 15 MILLIGRAM(S): at 20:20

## 2024-02-05 RX ADMIN — Medication 400 MILLIGRAM(S): at 02:00

## 2024-02-05 RX ADMIN — Medication 112 MICROGRAM(S): at 07:24

## 2024-02-05 RX ADMIN — SACUBITRIL AND VALSARTAN 1 TABLET(S): 24; 26 TABLET, FILM COATED ORAL at 07:24

## 2024-02-05 RX ADMIN — Medication 650 MILLIGRAM(S): at 17:48

## 2024-02-05 RX ADMIN — Medication 12.5 MILLIGRAM(S): at 03:55

## 2024-02-05 RX ADMIN — Medication 25 MILLIGRAM(S): at 07:24

## 2024-02-05 RX ADMIN — APIXABAN 5 MILLIGRAM(S): 2.5 TABLET, FILM COATED ORAL at 17:49

## 2024-02-05 RX ADMIN — Medication 1000 MILLIGRAM(S): at 03:00

## 2024-02-05 RX ADMIN — SACUBITRIL AND VALSARTAN 1 TABLET(S): 24; 26 TABLET, FILM COATED ORAL at 17:49

## 2024-02-05 RX ADMIN — Medication 3 MILLIGRAM(S): at 03:55

## 2024-02-05 RX ADMIN — Medication 15 MILLIGRAM(S): at 23:51

## 2024-02-06 LAB
ANION GAP SERPL CALC-SCNC: 10 MMOL/L — SIGNIFICANT CHANGE UP (ref 7–14)
BASOPHILS # BLD AUTO: 0.03 K/UL — SIGNIFICANT CHANGE UP (ref 0–0.2)
BASOPHILS NFR BLD AUTO: 0.7 % — SIGNIFICANT CHANGE UP (ref 0–2)
BUN SERPL-MCNC: 22 MG/DL — SIGNIFICANT CHANGE UP (ref 7–23)
CALCIUM SERPL-MCNC: 9.4 MG/DL — SIGNIFICANT CHANGE UP (ref 8.4–10.5)
CHLORIDE SERPL-SCNC: 103 MMOL/L — SIGNIFICANT CHANGE UP (ref 98–107)
CO2 SERPL-SCNC: 23 MMOL/L — SIGNIFICANT CHANGE UP (ref 22–31)
CREAT SERPL-MCNC: 0.79 MG/DL — SIGNIFICANT CHANGE UP (ref 0.5–1.3)
EGFR: 70 ML/MIN/1.73M2 — SIGNIFICANT CHANGE UP
EOSINOPHIL # BLD AUTO: 0.09 K/UL — SIGNIFICANT CHANGE UP (ref 0–0.5)
EOSINOPHIL NFR BLD AUTO: 2 % — SIGNIFICANT CHANGE UP (ref 0–6)
GLUCOSE SERPL-MCNC: 88 MG/DL — SIGNIFICANT CHANGE UP (ref 70–99)
HCT VFR BLD CALC: 36.3 % — SIGNIFICANT CHANGE UP (ref 34.5–45)
HGB BLD-MCNC: 12.5 G/DL — SIGNIFICANT CHANGE UP (ref 11.5–15.5)
IANC: 2.55 K/UL — SIGNIFICANT CHANGE UP (ref 1.8–7.4)
IMM GRANULOCYTES NFR BLD AUTO: 0.2 % — SIGNIFICANT CHANGE UP (ref 0–0.9)
LYMPHOCYTES # BLD AUTO: 1.33 K/UL — SIGNIFICANT CHANGE UP (ref 1–3.3)
LYMPHOCYTES # BLD AUTO: 29.4 % — SIGNIFICANT CHANGE UP (ref 13–44)
MCHC RBC-ENTMCNC: 32.6 PG — SIGNIFICANT CHANGE UP (ref 27–34)
MCHC RBC-ENTMCNC: 34.4 GM/DL — SIGNIFICANT CHANGE UP (ref 32–36)
MCV RBC AUTO: 94.8 FL — SIGNIFICANT CHANGE UP (ref 80–100)
MONOCYTES # BLD AUTO: 0.51 K/UL — SIGNIFICANT CHANGE UP (ref 0–0.9)
MONOCYTES NFR BLD AUTO: 11.3 % — SIGNIFICANT CHANGE UP (ref 2–14)
NEUTROPHILS # BLD AUTO: 2.55 K/UL — SIGNIFICANT CHANGE UP (ref 1.8–7.4)
NEUTROPHILS NFR BLD AUTO: 56.4 % — SIGNIFICANT CHANGE UP (ref 43–77)
NRBC # BLD: 0 /100 WBCS — SIGNIFICANT CHANGE UP (ref 0–0)
NRBC # FLD: 0 K/UL — SIGNIFICANT CHANGE UP (ref 0–0)
PLATELET # BLD AUTO: 169 K/UL — SIGNIFICANT CHANGE UP (ref 150–400)
POTASSIUM SERPL-MCNC: 4.3 MMOL/L — SIGNIFICANT CHANGE UP (ref 3.5–5.3)
POTASSIUM SERPL-SCNC: 4.3 MMOL/L — SIGNIFICANT CHANGE UP (ref 3.5–5.3)
RBC # BLD: 3.83 M/UL — SIGNIFICANT CHANGE UP (ref 3.8–5.2)
RBC # FLD: 13.9 % — SIGNIFICANT CHANGE UP (ref 10.3–14.5)
SODIUM SERPL-SCNC: 136 MMOL/L — SIGNIFICANT CHANGE UP (ref 135–145)
WBC # BLD: 4.52 K/UL — SIGNIFICANT CHANGE UP (ref 3.8–10.5)
WBC # FLD AUTO: 4.52 K/UL — SIGNIFICANT CHANGE UP (ref 3.8–10.5)

## 2024-02-06 RX ORDER — KETOROLAC TROMETHAMINE 30 MG/ML
15 SYRINGE (ML) INJECTION ONCE
Refills: 0 | Status: DISCONTINUED | OUTPATIENT
Start: 2024-02-06 | End: 2024-02-06

## 2024-02-06 RX ORDER — LOPERAMIDE HCL 2 MG
2 TABLET ORAL ONCE
Refills: 0 | Status: COMPLETED | OUTPATIENT
Start: 2024-02-06 | End: 2024-02-06

## 2024-02-06 RX ADMIN — Medication 2 MILLIGRAM(S): at 17:59

## 2024-02-06 RX ADMIN — PANTOPRAZOLE SODIUM 40 MILLIGRAM(S): 20 TABLET, DELAYED RELEASE ORAL at 06:29

## 2024-02-06 RX ADMIN — APIXABAN 5 MILLIGRAM(S): 2.5 TABLET, FILM COATED ORAL at 17:59

## 2024-02-06 RX ADMIN — Medication 650 MILLIGRAM(S): at 19:34

## 2024-02-06 RX ADMIN — APIXABAN 5 MILLIGRAM(S): 2.5 TABLET, FILM COATED ORAL at 06:28

## 2024-02-06 RX ADMIN — SACUBITRIL AND VALSARTAN 1 TABLET(S): 24; 26 TABLET, FILM COATED ORAL at 06:29

## 2024-02-06 RX ADMIN — SIMVASTATIN 20 MILLIGRAM(S): 20 TABLET, FILM COATED ORAL at 22:24

## 2024-02-06 RX ADMIN — SACUBITRIL AND VALSARTAN 1 TABLET(S): 24; 26 TABLET, FILM COATED ORAL at 17:59

## 2024-02-06 RX ADMIN — Medication 25 MILLIGRAM(S): at 06:29

## 2024-02-06 RX ADMIN — Medication 15 MILLIGRAM(S): at 00:36

## 2024-02-06 RX ADMIN — Medication 112 MICROGRAM(S): at 06:29

## 2024-02-06 NOTE — PROGRESS NOTE ADULT - PROBLEM SELECTOR PLAN 4
currently euvolemic   -c/w metoprolol, Entresto  -echo

## 2024-02-06 NOTE — PROVIDER CONTACT NOTE (OTHER) - ASSESSMENT
Pt with recurring abdominal pain, c/o 5/10 pain. no current regiment for pain scale. She describes a 'j-like pain in her abdomen that radiates to her back.
Patient A&ox4. Pt asymptomatic. Pt denies chest pain, sob, palpitations, dizziness, HA.
VS charted. She denies any symptoms

## 2024-02-06 NOTE — PROVIDER CONTACT NOTE (OTHER) - BACKGROUND
dizziness and giddiness
Pt with Afib on tele, bradicardia in 50s. 3.80sec pause on tele
Pt with recurring abdominal pain, c/o 5/10 pain. no current regiment for pain scale. Turner is always there per patient

## 2024-02-06 NOTE — PROVIDER CONTACT NOTE (OTHER) - ACTION/TREATMENT ORDERED:
continue tele monitoring
Ketorolac ordered and administered
Provider made aware. Provider to assess patient at bedside. Safety maintained. Will continue to monitor.

## 2024-02-06 NOTE — PROGRESS NOTE ADULT - PROVIDER SPECIALTY LIST ADULT
Internal Medicine
Cardiology
Internal Medicine

## 2024-02-06 NOTE — PROGRESS NOTE ADULT - PROBLEM SELECTOR PLAN 1
suspect peripheral etiology  -CTH and CTA headache/neck no acute pathology or high grade stenosis. 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 low threshold to obtain further neuro imaging (eg, MRI brain) if new neuro deficits or prolonged symptoms  -PT eval
suspect peripheral etiology  -CTH and CTA headache/neck no acute pathology or high grade stenosis. 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 low threshold to obtain further neuro imaging (eg, MRI brain) if new neuro deficits or prolonged symptoms  PT eval reviewed
suspect peripheral etiology  -CTH and CTA headache/neck no acute pathology or high grade stenosis. 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 low threshold to obtain further neuro imaging (eg, MRI brain) if new neuro deficits or prolonged symptoms  -PT eval

## 2024-02-06 NOTE — PROGRESS NOTE ADULT - PROBLEM SELECTOR PROBLEM 4
Chronic HFrEF (heart failure with reduced ejection fraction)

## 2024-02-06 NOTE — PROGRESS NOTE ADULT - PROBLEM SELECTOR PLAN 2
EKG afib rate controlled  -cont metoprolol, apixaban   echo
EKG afib rate controlled  -cont metoprolol, apixaban   echo
EKG afib rate controlled  -cont metoprolol, apixaban   -check echo
EKG afib rate controlled  -cont metoprolol, apixaban   echo
EKG afib rate controlled  -cont metoprolol, apixaban   < from: TTE W or WO Ultrasound Enhancing Agent (02.05.24 @ 09:42) >       1. Left ventricular systolic function is normal with an ejection fraction of 64 % by Alexander's method of disks.   2. Normal right ventricular cavity size, wall thickness, and normal systolic function. Tricuspid annular plane systolic excursion (TAPSE) is 2.0 cm (normal >=1.7 cm).   3. The left atrium is moderately dilated.    < end of copied text >

## 2024-02-06 NOTE — PROGRESS NOTE ADULT - ASSESSMENT
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

## 2024-02-06 NOTE — PROGRESS NOTE ADULT - SUBJECTIVE AND OBJECTIVE BOX
pt was evaluated by hospitalist earlier during the day
    SUBJECTIVE / OVERNIGHT EVENTS:pt seen and examined  02-06-24    MEDICATIONS  (STANDING):  acetaminophen   IVPB .. 1000 milliGRAM(s) IV Intermittent once  apixaban 5 milliGRAM(s) Oral two times a day  levothyroxine 112 MICROGram(s) Oral daily  metoprolol succinate ER 25 milliGRAM(s) Oral daily  pantoprazole    Tablet 40 milliGRAM(s) Oral before breakfast  sacubitril 24 mG/valsartan 26 mG 1 Tablet(s) Oral two times a day  simvastatin 20 milliGRAM(s) Oral at bedtime    MEDICATIONS  (PRN):  acetaminophen     Tablet .. 650 milliGRAM(s) Oral every 6 hours PRN Temp greater or equal to 38C (100.4F), Mild Pain (1 - 3)  aluminum hydroxide/magnesium hydroxide/simethicone Suspension 30 milliLiter(s) Oral every 4 hours PRN Dyspepsia  meclizine 12.5 milliGRAM(s) Oral every 6 hours PRN Dizziness  melatonin 3 milliGRAM(s) Oral at bedtime PRN Insomnia  ondansetron Injectable 4 milliGRAM(s) IV Push every 8 hours PRN Nausea and/or Vomiting      Vital Signs Last 24 Hrs  T(C): 36.4 (02-06-24 @ 12:45), Max: 36.7 (02-06-24 @ 03:17)  T(F): 97.5 (02-06-24 @ 12:45), Max: 98.1 (02-06-24 @ 03:17)  HR: 50 (02-06-24 @ 12:45) (50 - 67)  BP: 134/61 (02-06-24 @ 12:45) (134/61 - 153/78)  BP(mean): --  RR: 18 (02-06-24 @ 12:45) (17 - 19)  SpO2: 100% (02-06-24 @ 12:45) (97% - 100%)    I&O's Summary      Constitutional: No fever, fatigue  Skin: No rash.  Eyes: No recent vision problems or eye pain.  ENT: No congestion, ear pain, or sore throat.  Cardiovascular: No chest pain or palpation.  Respiratory: No cough, shortness of breath, congestion, or wheezing.  Gastrointestinal: No abdominal pain, nausea, vomiting, or diarrhea.  Genitourinary: No dysuria.  Musculoskeletal: No joint swelling.  Neurologic: No headache.  dizziness+  PHYSICAL EXAM:  GENERAL: NAD  EYES: EOMI, PERRLA  NECK: Supple, No JVD  CHEST/LUNG: dec breath sounds at bases  HEART:  S1 , S2 +  ABDOMEN: soft , bs+  EXTREMITIES:  no edema  NEUROLOGY:alert awake follows commands      LABS:  02-06    136  |  103  |  22  ----------------------------<  88  4.3   |  23  |  0.79    Ca    9.4      06 Feb 2024 05:49      Creatinine Trend: 0.79 <--, 0.86 <--, 0.66 <--, 0.64 <--, 0.71 <--, 0.76 <--, 0.75 <--                        12.5   4.52  )-----------( 169      ( 06 Feb 2024 05:49 )             36.3     Urine Studies:  Urinalysis Basic - ( 06 Feb 2024 05:49 )    Color:  / Appearance:  / SG:  / pH:   Gluc: 88 mg/dL / Ketone:   / Bili:  / Urobili:    Blood:  / Protein:  / Nitrite:    Leuk Esterase:  / RBC:  / WBC    Sq Epi:  / Non Sq Epi:  / Bacteria:                       Imaging Personally Reviewed:yes    Consultant(s) Notes Reviewed:  yes    Care Discussed with Consultants/Other Providers:yes  
    SUBJECTIVE / OVERNIGHT EVENTS:pt seen and examined  02-05-24    MEDICATIONS  (STANDING):  acetaminophen   IVPB .. 1000 milliGRAM(s) IV Intermittent once  apixaban 5 milliGRAM(s) Oral two times a day  levothyroxine 112 MICROGram(s) Oral daily  metoprolol succinate ER 25 milliGRAM(s) Oral daily  pantoprazole    Tablet 40 milliGRAM(s) Oral before breakfast  sacubitril 24 mG/valsartan 26 mG 1 Tablet(s) Oral two times a day  simvastatin 20 milliGRAM(s) Oral at bedtime    MEDICATIONS  (PRN):  acetaminophen     Tablet .. 650 milliGRAM(s) Oral every 6 hours PRN Temp greater or equal to 38C (100.4F), Mild Pain (1 - 3)  aluminum hydroxide/magnesium hydroxide/simethicone Suspension 30 milliLiter(s) Oral every 4 hours PRN Dyspepsia  meclizine 12.5 milliGRAM(s) Oral every 6 hours PRN Dizziness  melatonin 3 milliGRAM(s) Oral at bedtime PRN Insomnia  ondansetron Injectable 4 milliGRAM(s) IV Push every 8 hours PRN Nausea and/or Vomiting    Vital Signs Last 24 Hrs  T(C): 36.3 (02-05-24 @ 19:54), Max: 36.5 (02-05-24 @ 14:45)  T(F): 97.3 (02-05-24 @ 19:54), Max: 97.7 (02-05-24 @ 14:45)  HR: 59 (02-05-24 @ 19:54) (58 - 62)  BP: 140/65 (02-05-24 @ 19:54) (97/64 - 154/75)  BP(mean): --  RR: 18 (02-05-24 @ 19:54) (18 - 18)  SpO2: 100% (02-05-24 @ 19:54) (98% - 100%)    I&O's Summary      Constitutional: No fever, fatigue  Skin: No rash.  Eyes: No recent vision problems or eye pain.  ENT: No congestion, ear pain, or sore throat.  Cardiovascular: No chest pain or palpation.  Respiratory: No cough, shortness of breath, congestion, or wheezing.  Gastrointestinal: No abdominal pain, nausea, vomiting, or diarrhea.  Genitourinary: No dysuria.  Musculoskeletal: No joint swelling.  Neurologic: No headache.  dizziness+  PHYSICAL EXAM:  GENERAL: NAD  EYES: EOMI, PERRLA  NECK: Supple, No JVD  CHEST/LUNG: dec breath sounds at bases  HEART:  S1 , S2 +  ABDOMEN: soft , bs+  EXTREMITIES:  no edema  NEUROLOGY:alert awake follows commands      LABS:  02-04    137  |  103  |  18  ----------------------------<  119<H>  4.2   |  22  |  0.86    Ca    9.5      04 Feb 2024 21:53  Phos  3.5     02-04  Mg     1.70     02-04      Creatinine Trend: 0.86 <--, 0.66 <--, 0.64 <--, 0.71 <--, 0.76 <--, 0.75 <--                        12.9   5.48  )-----------( 160      ( 04 Feb 2024 21:53 )             37.3     Urine Studies:  Urinalysis Basic - ( 04 Feb 2024 21:53 )    Color:  / Appearance:  / SG:  / pH:   Gluc: 119 mg/dL / Ketone:   / Bili:  / Urobili:    Blood:  / Protein:  / Nitrite:    Leuk Esterase:  / RBC:  / WBC    Sq Epi:  / Non Sq Epi:  / Bacteria:         CARDIAC MARKERS ( 04 Feb 2024 18:14 )  x     / x     / x     / x     / 2.4 ng/mL              Imaging Personally Reviewed:yes    Consultant(s) Notes Reviewed:  yes    Care Discussed with Consultants/Other Providers:yes  
    SUBJECTIVE / OVERNIGHT EVENTS:pt seen and examined  02-03-24    MEDICATIONS  (STANDING):  apixaban 5 milliGRAM(s) Oral two times a day  levothyroxine 112 MICROGram(s) Oral daily  meclizine 12.5 milliGRAM(s) Oral every 6 hours  metoprolol succinate ER 25 milliGRAM(s) Oral daily  sacubitril 24 mG/valsartan 26 mG 1 Tablet(s) Oral two times a day  simvastatin 20 milliGRAM(s) Oral at bedtime    MEDICATIONS  (PRN):  acetaminophen     Tablet .. 650 milliGRAM(s) Oral every 6 hours PRN Temp greater or equal to 38C (100.4F), Mild Pain (1 - 3)  aluminum hydroxide/magnesium hydroxide/simethicone Suspension 30 milliLiter(s) Oral every 4 hours PRN Dyspepsia  meclizine 12.5 milliGRAM(s) Oral every 6 hours PRN Dizziness  melatonin 3 milliGRAM(s) Oral at bedtime PRN Insomnia  ondansetron Injectable 4 milliGRAM(s) IV Push every 8 hours PRN Nausea and/or Vomiting    Vital Signs Last 24 Hrs  T(C): 36.3 (02-03-24 @ 20:11), Max: 36.8 (02-03-24 @ 05:38)  T(F): 97.4 (02-03-24 @ 20:11), Max: 98.3 (02-03-24 @ 05:38)  HR: 65 (02-03-24 @ 21:49) (65 - 78)  BP: 147/79 (02-03-24 @ 21:49) (109/91 - 151/87)  BP(mean): --  RR: 18 (02-03-24 @ 20:11) (18 - 18)  SpO2: 100% (02-03-24 @ 20:11) (98% - 100%)    Orthostatic VS  02-03-24 @ 14:44  Lying BP: 112/63 HR: 69  Sitting BP: 131/74 HR: 60  Standing BP: 128/86 HR: 90  Site: --  Mode: --          I&O's Summary      Constitutional: No fever, fatigue  Skin: No rash.  Eyes: No recent vision problems or eye pain.  ENT: No congestion, ear pain, or sore throat.  Cardiovascular: No chest pain or palpation.  Respiratory: No cough, shortness of breath, congestion, or wheezing.  Gastrointestinal: No abdominal pain, nausea, vomiting, or diarrhea.  Genitourinary: No dysuria.  Musculoskeletal: No joint swelling.  Neurologic: No headache.  dizziness+  PHYSICAL EXAM:  GENERAL: NAD  EYES: EOMI, PERRLA  NECK: Supple, No JVD  CHEST/LUNG: dec breath sounds at bases  HEART:  S1 , S2 +  ABDOMEN: soft , bs+  EXTREMITIES:  no edema  NEUROLOGY:alert awake follows commands      LABS:                        13.0   4.71  )-----------( 173      ( 03 Feb 2024 23:34 )             39.2     02-03    138  |  103  |  17  ----------------------------<  110<H>  4.0   |  23  |  0.76    Ca    9.9      03 Feb 2024 14:29    TPro  7.2  /  Alb  3.8  /  TBili  2.2<H>  /  DBili  x   /  AST  17  /  ALT  11  /  AlkPhos  45  02-02    PT/INR - ( 02 Feb 2024 11:26 )   PT: 15.0 sec;   INR: 1.34 ratio         PTT - ( 02 Feb 2024 11:26 )  PTT:34.6 sec      Urinalysis Basic - ( 03 Feb 2024 14:29 )    Color: x / Appearance: x / SG: x / pH: x  Gluc: 110 mg/dL / Ketone: x  / Bili: x / Urobili: x   Blood: x / Protein: x / Nitrite: x   Leuk Esterase: x / RBC: x / WBC x   Sq Epi: x / Non Sq Epi: x / Bacteria: x        RADIOLOGY & ADDITIONAL TESTS:    Imaging Personally Reviewed:    Consultant(s) Notes Reviewed:      Care Discussed with Consultants/Other Providers:  
    SUBJECTIVE / OVERNIGHT EVENTS:pt seen and examined  02-04-24    MEDICATIONS  (STANDING):  apixaban 5 milliGRAM(s) Oral two times a day  levothyroxine 112 MICROGram(s) Oral daily  meclizine 12.5 milliGRAM(s) Oral every 6 hours  metoprolol succinate ER 25 milliGRAM(s) Oral daily  sacubitril 24 mG/valsartan 26 mG 1 Tablet(s) Oral two times a day  simvastatin 20 milliGRAM(s) Oral at bedtime    MEDICATIONS  (PRN):  acetaminophen     Tablet .. 650 milliGRAM(s) Oral every 6 hours PRN Temp greater or equal to 38C (100.4F), Mild Pain (1 - 3)  aluminum hydroxide/magnesium hydroxide/simethicone Suspension 30 milliLiter(s) Oral every 4 hours PRN Dyspepsia  meclizine 12.5 milliGRAM(s) Oral every 6 hours PRN Dizziness  melatonin 3 milliGRAM(s) Oral at bedtime PRN Insomnia  ondansetron Injectable 4 milliGRAM(s) IV Push every 8 hours PRN Nausea and/or Vomiting    Vital Signs Last 24 Hrs  T(C): 36.9 (02-04-24 @ 05:26), Max: 36.9 (02-04-24 @ 05:26)  T(F): 98.4 (02-04-24 @ 05:26), Max: 98.4 (02-04-24 @ 05:26)  HR: 68 (02-04-24 @ 05:26) (65 - 68)  BP: 142/72 (02-04-24 @ 05:26) (135/66 - 147/79)  BP(mean): --  RR: 18 (02-04-24 @ 05:26) (18 - 18)  SpO2: 97% (02-04-24 @ 05:26) (97% - 100%)    Orthostatic VS  02-03-24 @ 14:44  Lying BP: 112/63 HR: 69  Sitting BP: 131/74 HR: 60  Standing BP: 128/86 HR: 90  Site: --  Mode: --      I&O's Summary      Constitutional: No fever, fatigue  Skin: No rash.  Eyes: No recent vision problems or eye pain.  ENT: No congestion, ear pain, or sore throat.  Cardiovascular: No chest pain or palpation.  Respiratory: No cough, shortness of breath, congestion, or wheezing.  Gastrointestinal: No abdominal pain, nausea, vomiting, or diarrhea.  Genitourinary: No dysuria.  Musculoskeletal: No joint swelling.  Neurologic: No headache.  dizziness+  PHYSICAL EXAM:  GENERAL: NAD  EYES: EOMI, PERRLA  NECK: Supple, No JVD  CHEST/LUNG: dec breath sounds at bases  HEART:  S1 , S2 +  ABDOMEN: soft , bs+  EXTREMITIES:  no edema  NEUROLOGY:alert awake follows commands      LABS:  02-04    134<L>  |  102  |  15  ----------------------------<  107<H>  3.8   |  20<L>  |  0.64    Ca    9.3      04 Feb 2024 06:39  Phos  3.3     02-03  Mg     1.70     02-03      Creatinine Trend: 0.64 <--, 0.71 <--, 0.76 <--, 0.75 <--                        12.8   5.01  )-----------( 169      ( 04 Feb 2024 06:00 )             37.8     Urine Studies:  Urinalysis Basic - ( 04 Feb 2024 06:39 )    Color:  / Appearance:  / SG:  / pH:   Gluc: 107 mg/dL / Ketone:   / Bili:  / Urobili:    Blood:  / Protein:  / Nitrite:    Leuk Esterase:  / RBC:  / WBC    Sq Epi:  / Non Sq Epi:  / Bacteria:                       RADIOLOGY & ADDITIONAL TESTS:    Imaging Personally Reviewed:yes    Consultant(s) Notes Reviewed:  yes    Care Discussed with Consultants/Other Providers:yes  
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 Pt reports that started feeling dizzy with sensation of room spinning around her since 1 week ago. Symptoms are on and off, each episode lasts a few hours, associated with N/V, better with rest, worse when she moves her head/body, to the point that she feels losing balance on her feet and unable to walk. She denies headache, vision changes, ear pain/ringing, chest pain, palpitation. Denies new focal neurodeficits inc. dysphagia, dysarthria,  unilateral weakness, numbness, She reports hx of  dizziness d/t dehydration d/t diarrhea, but this time symptoms are much worse and last longer. Denies excessive diarrhea lately. No fever, chills, sick contact. No recent change of medications.    In ER, pt is afebrile, /80 HR 60 SPO2 100%. CTH, CTA H/N unremarkable.  Admitted for symptom management of vertigo     PAST MEDICAL & SURGICAL HISTORY:  Hypothyroidism      Hypercholesteremia      CA - Breast Cancer      History of Mastectomy      History of Mastectomy      History of Thyroidectomy          Review of Systems:   CONSTITUTIONAL: No fever, weight loss, or fatigue  EYES: No eye pain, visual disturbances, or discharge  ENMT:  No difficulty hearing, tinnitus. No sinus or throat pain  NECK: No pain or stiffness  RESPIRATORY: No cough, wheezing, chills or hemoptysis; No shortness of breath  CARDIOVASCULAR: No chest pain, palpitations, dizziness, or leg swelling  GASTROINTESTINAL: No abdominal or epigastric pain. No nausea, vomiting, or hematemesis; No diarrhea or constipation. No melena or hematochezia.  GENITOURINARY: No dysuria, frequency, hematuria, or incontinence  NEUROLOGICAL: No headaches, loss of strength, numbness, or tremors  SKIN: No itching, burning, rashes, or lesions   MUSCULOSKELETAL: No joint pain or swelling; No muscle, back, or extremity pain        Allergies    No Known Allergies    Intolerances        Social History:     Denies ETOH, illicit drug use, tobacco     FAMILY HISTORY:    non-contributory     MEDICATIONS  (STANDING):  apixaban 5 milliGRAM(s) Oral two times a day  levothyroxine 112 MICROGram(s) Oral daily  metoprolol succinate ER 25 milliGRAM(s) Oral daily  sacubitril 24 mG/valsartan 26 mG 1 Tablet(s) Oral two times a day  simvastatin 20 milliGRAM(s) Oral at bedtime    MEDICATIONS  (PRN):  acetaminophen     Tablet .. 650 milliGRAM(s) Oral every 6 hours PRN Temp greater or equal to 38C (100.4F), Mild Pain (1 - 3)  aluminum hydroxide/magnesium hydroxide/simethicone Suspension 30 milliLiter(s) Oral every 4 hours PRN Dyspepsia  meclizine 12.5 milliGRAM(s) Oral every 6 hours PRN Dizziness  melatonin 3 milliGRAM(s) Oral at bedtime PRN Insomnia  ondansetron Injectable 4 milliGRAM(s) IV Push every 8 hours PRN Nausea and/or Vomiting      T(C): 36.7 (02-02-24 @ 15:45), Max: 36.7 (02-02-24 @ 15:45)  HR: 60 (02-02-24 @ 15:45) (60 - 66)  BP: 118/71 (02-02-24 @ 15:45) (118/71 - 149/79)  RR: 16 (02-02-24 @ 15:45) (16 - 18)  SpO2: 99% (02-02-24 @ 15:45) (94% - 100%)    CAPILLARY BLOOD GLUCOSE        I&O's Summary      PHYSICAL EXAM:  GENERAL: NAD, well-developed  HEAD:  Atraumatic, Normocephalic  EYES: EOMI, PERRLA, conjunctiva and sclera clear. no nystagmus   NECK: Supple, No elevated JVD  CHEST/LUNG: Clear to auscultation bilaterally; No wheeze  HEART: Regular rate and rhythm; No murmurs, rubs, or gallops  ABDOMEN: Soft, Nontender, Nondistended; Bowel sounds present  EXTREMITIES:  2+ Peripheral Pulses, No clubbing, cyanosis, or edema  PSYCH: AAOx3  NEUROLOGY: CN II-XII grossly intact, moving all extremities. unable to assess gait d/t dizziness   SKIN: No rashes or lesions    LABS:                        13.7   5.84  )-----------( 178      ( 02 Feb 2024 11:26 )             40.9     02-02    140  |  105  |  14  ----------------------------<  98  4.3   |  21<L>  |  0.75    Ca    9.7      02 Feb 2024 11:26    TPro  7.2  /  Alb  3.8  /  TBili  2.2<H>  /  DBili  x   /  AST  17  /  ALT  11  /  AlkPhos  45  02-02    PT/INR - ( 02 Feb 2024 11:26 )   PT: 15.0 sec;   INR: 1.34 ratio         PTT - ( 02 Feb 2024 11:26 )  PTT:34.6 sec      Urinalysis Basic - ( 02 Feb 2024 11:26 )    Color: x / Appearance: x / SG: x / pH: x  Gluc: 98 mg/dL / Ketone: x  / Bili: x / Urobili: x   Blood: x / Protein: x / Nitrite: x   Leuk Esterase: x / RBC: x / WBC x   Sq Epi: x / Non Sq Epi: x / Bacteria: x             (02 Feb 2024 17:35)      INTERIM EVENTS:Patient seen at bedside ,interim events noted.      PMH -reviewed admission note, no change since admission  HEART FAILURE: Acute[ ]Chronic[ ] Systolic[ ] Diastolic[ ] Combined Systolic and Diastolic[ ]  CAD[ ] CABG[ ] PCI[ ]  DEVICES[ ] PPM[ ] ICD[ ] ILR[ ]  ATRIAL FIBRILLATION[ ] Paroxysmal[ ] Permanent[ ] CHADS2-[  ]  KHALIF[ ] CKD1[ ] CKD2[ ] CKD3[ ] CKD4[ ] ESRD[ ]  COPD[ ] HTN[ ]   DM[ ] Type1[ ] Type 2[ ]   CVA[ ] Paresis[ ]    AMBULATION: Assisted[ ] Cane/walker[ ] Independent[ ]    MEDICATIONS  (STANDING):  apixaban 5 milliGRAM(s) Oral two times a day  levothyroxine 112 MICROGram(s) Oral daily  metoprolol succinate ER 25 milliGRAM(s) Oral daily  pantoprazole    Tablet 40 milliGRAM(s) Oral once  sacubitril 24 mG/valsartan 26 mG 1 Tablet(s) Oral two times a day  simvastatin 20 milliGRAM(s) Oral at bedtime    MEDICATIONS  (PRN):  acetaminophen     Tablet .. 650 milliGRAM(s) Oral every 6 hours PRN Temp greater or equal to 38C (100.4F), Mild Pain (1 - 3)  aluminum hydroxide/magnesium hydroxide/simethicone Suspension 30 milliLiter(s) Oral every 4 hours PRN Dyspepsia  meclizine 12.5 milliGRAM(s) Oral every 6 hours PRN Dizziness  melatonin 3 milliGRAM(s) Oral at bedtime PRN Insomnia  ondansetron Injectable 4 milliGRAM(s) IV Push every 8 hours PRN Nausea and/or Vomiting            REVIEW OF SYSTEMS:  Constitutional: [ ] fever, [ ]weight loss,  [ ]fatigue [ ]weight gain  Eyes: [ ] visual changes  Respiratory: [ ]shortness of breath;  [ ] cough, [ ]wheezing, [ ]chills, [ ]hemoptysis  Cardiovascular: [ ] chest pain, [ ]palpitations, [ ]dizziness,  [ ]leg swelling[ ]orthopnea[ ]PND  Gastrointestinal: [ ] abdominal pain, [ ]nausea, [ ]vomiting,  [ ]diarrhea [ ]Constipation [ ]Melena  Genitourinary: [ ] dysuria, [ ] hematuria [ ][place]  Neurologic: [ ] headaches [ ] tremors[ ]weakness [ ]Paralysis Right[ ] Left[ ]  Skin: [ ] itching, [ ]burning, [ ] rashes  Endocrine: [ ] heat or cold intolerance  Musculoskeletal: [ ] joint pain or swelling; [ ] muscle, back, or extremity pain  Psychiatric: [ ] depression, [ ]anxiety, [ ]mood swings, or [ ]difficulty sleeping  Hematologic: [ ] easy bruising, [ ] bleeding gums    [ ] All remaining systems negative except as per above.   [ ]Unable to obtain.  [x] No change in ROS since admission      Vital Signs Last 24 Hrs  T(C): 36.2 (04 Feb 2024 13:29), Max: 36.9 (04 Feb 2024 05:26)  T(F): 97.2 (04 Feb 2024 13:29), Max: 98.4 (04 Feb 2024 05:26)  HR: 57 (04 Feb 2024 17:01) (57 - 68)  BP: 130/64 (04 Feb 2024 17:01) (105/84 - 147/79)  BP(mean): --  RR: 18 (04 Feb 2024 17:01) (18 - 18)  SpO2: 98% (04 Feb 2024 17:01) (97% - 99%)    Parameters below as of 04 Feb 2024 17:01  Patient On (Oxygen Delivery Method): room air      I&O's Summary      PHYSICAL EXAM:  General: No acute distress BMI-  HEENT: EOMI, PERRL  Neck: Supple, [ ] JVD  Lungs: Equal air entry bilaterally; [ ] rales [ ] wheezing [ ] rhonchi  Heart: Regular rate and rhythm; [x ] murmur   2/6 [ x] systolic [ ] diastolic [ ] radiation[ ] rubs [ ]  gallops  Abdomen: Nontender, bowel sounds present  Extremities: No clubbing, cyanosis, [ ] edema [ ]Pulses  equal and intact  Nervous system:  Alert & Oriented X3, no focal deficits  Psychiatric: Normal affect  Skin: No rashes or lesions    LABS:  02-04    135  |  102  |  15  ----------------------------<  89  5.5<H>   |  17<L>  |  0.66    Ca    9.7      04 Feb 2024 18:14  Phos  3.3     02-03  Mg     1.70     02-03      Creatinine Trend: 0.66<--, 0.64<--, 0.71<--, 0.76<--, 0.75<--                        12.8   5.01  )-----------( 169      ( 04 Feb 2024 06:00 )             37.8

## 2024-02-07 ENCOUNTER — TRANSCRIPTION ENCOUNTER (OUTPATIENT)
Age: 89
End: 2024-02-07

## 2024-02-07 VITALS
RESPIRATION RATE: 16 BRPM | SYSTOLIC BLOOD PRESSURE: 104 MMHG | DIASTOLIC BLOOD PRESSURE: 64 MMHG | OXYGEN SATURATION: 100 % | TEMPERATURE: 98 F | HEART RATE: 68 BPM

## 2024-02-07 LAB
ANION GAP SERPL CALC-SCNC: 11 MMOL/L — SIGNIFICANT CHANGE UP (ref 7–14)
BASOPHILS # BLD AUTO: 0.03 K/UL — SIGNIFICANT CHANGE UP (ref 0–0.2)
BASOPHILS NFR BLD AUTO: 0.7 % — SIGNIFICANT CHANGE UP (ref 0–2)
BUN SERPL-MCNC: 14 MG/DL — SIGNIFICANT CHANGE UP (ref 7–23)
CALCIUM SERPL-MCNC: 9.4 MG/DL — SIGNIFICANT CHANGE UP (ref 8.4–10.5)
CHLORIDE SERPL-SCNC: 105 MMOL/L — SIGNIFICANT CHANGE UP (ref 98–107)
CO2 SERPL-SCNC: 22 MMOL/L — SIGNIFICANT CHANGE UP (ref 22–31)
CREAT SERPL-MCNC: 0.71 MG/DL — SIGNIFICANT CHANGE UP (ref 0.5–1.3)
EGFR: 79 ML/MIN/1.73M2 — SIGNIFICANT CHANGE UP
EOSINOPHIL # BLD AUTO: 0.1 K/UL — SIGNIFICANT CHANGE UP (ref 0–0.5)
EOSINOPHIL NFR BLD AUTO: 2.3 % — SIGNIFICANT CHANGE UP (ref 0–6)
GLUCOSE SERPL-MCNC: 93 MG/DL — SIGNIFICANT CHANGE UP (ref 70–99)
HCT VFR BLD CALC: 36.8 % — SIGNIFICANT CHANGE UP (ref 34.5–45)
HGB BLD-MCNC: 12.6 G/DL — SIGNIFICANT CHANGE UP (ref 11.5–15.5)
IANC: 2.31 K/UL — SIGNIFICANT CHANGE UP (ref 1.8–7.4)
IMM GRANULOCYTES NFR BLD AUTO: 0.2 % — SIGNIFICANT CHANGE UP (ref 0–0.9)
LYMPHOCYTES # BLD AUTO: 1.33 K/UL — SIGNIFICANT CHANGE UP (ref 1–3.3)
LYMPHOCYTES # BLD AUTO: 30.9 % — SIGNIFICANT CHANGE UP (ref 13–44)
MCHC RBC-ENTMCNC: 32.6 PG — SIGNIFICANT CHANGE UP (ref 27–34)
MCHC RBC-ENTMCNC: 34.2 GM/DL — SIGNIFICANT CHANGE UP (ref 32–36)
MCV RBC AUTO: 95.3 FL — SIGNIFICANT CHANGE UP (ref 80–100)
MONOCYTES # BLD AUTO: 0.52 K/UL — SIGNIFICANT CHANGE UP (ref 0–0.9)
MONOCYTES NFR BLD AUTO: 12.1 % — SIGNIFICANT CHANGE UP (ref 2–14)
NEUTROPHILS # BLD AUTO: 2.31 K/UL — SIGNIFICANT CHANGE UP (ref 1.8–7.4)
NEUTROPHILS NFR BLD AUTO: 53.8 % — SIGNIFICANT CHANGE UP (ref 43–77)
NRBC # BLD: 0 /100 WBCS — SIGNIFICANT CHANGE UP (ref 0–0)
NRBC # FLD: 0 K/UL — SIGNIFICANT CHANGE UP (ref 0–0)
PLATELET # BLD AUTO: 169 K/UL — SIGNIFICANT CHANGE UP (ref 150–400)
POTASSIUM SERPL-MCNC: 4.1 MMOL/L — SIGNIFICANT CHANGE UP (ref 3.5–5.3)
POTASSIUM SERPL-SCNC: 4.1 MMOL/L — SIGNIFICANT CHANGE UP (ref 3.5–5.3)
RBC # BLD: 3.86 M/UL — SIGNIFICANT CHANGE UP (ref 3.8–5.2)
RBC # FLD: 14.2 % — SIGNIFICANT CHANGE UP (ref 10.3–14.5)
SODIUM SERPL-SCNC: 138 MMOL/L — SIGNIFICANT CHANGE UP (ref 135–145)
WBC # BLD: 4.3 K/UL — SIGNIFICANT CHANGE UP (ref 3.8–10.5)
WBC # FLD AUTO: 4.3 K/UL — SIGNIFICANT CHANGE UP (ref 3.8–10.5)

## 2024-02-07 RX ORDER — MECLIZINE HCL 12.5 MG
1 TABLET ORAL
Qty: 28 | Refills: 0
Start: 2024-02-07 | End: 2024-02-13

## 2024-02-07 RX ORDER — PANTOPRAZOLE SODIUM 20 MG/1
1 TABLET, DELAYED RELEASE ORAL
Qty: 0 | Refills: 0 | DISCHARGE
Start: 2024-02-07

## 2024-02-07 RX ADMIN — APIXABAN 5 MILLIGRAM(S): 2.5 TABLET, FILM COATED ORAL at 05:30

## 2024-02-07 RX ADMIN — Medication 112 MICROGRAM(S): at 05:30

## 2024-02-07 RX ADMIN — Medication 25 MILLIGRAM(S): at 05:30

## 2024-02-07 RX ADMIN — PANTOPRAZOLE SODIUM 40 MILLIGRAM(S): 20 TABLET, DELAYED RELEASE ORAL at 05:31

## 2024-02-07 RX ADMIN — SACUBITRIL AND VALSARTAN 1 TABLET(S): 24; 26 TABLET, FILM COATED ORAL at 05:30

## 2024-02-07 NOTE — DISCHARGE NOTE PROVIDER - CARE PROVIDER_API CALL
Chava Roberts [facility]  Neurology  [address] [place] [id number]  Phone: [phone]  Fax: [phone]  Follow Up Time: 1 week

## 2024-02-07 NOTE — DISCHARGE NOTE PROVIDER - NSDCMRMEDTOKEN_GEN_ALL_CORE_FT
Eliquis 5 mg oral tablet: 1 tab(s) orally 2 times a day  Entresto 24 mg-26 mg oral tablet: 1 tab(s) orally 2 times a day  metoprolol succinate 25 mg oral tablet, extended release: 1 tab(s) orally once a day  Synthroid 112 mcg (0.112 mg) oral tablet: 1 tab(s) orally once a day  Zocor 20 mg oral tablet: 1 tab(s) orally once a day (at bedtime)   Eliquis 5 mg oral tablet: 1 tab(s) orally 2 times a day  Entresto 24 mg-26 mg oral tablet: 1 tab(s) orally 2 times a day  metoprolol succinate 25 mg oral tablet, extended release: 1 tab(s) orally once a day  Outpatient PT: 3-4 x a week  pantoprazole 40 mg oral delayed release tablet: 1 tab(s) orally once a day (before a meal)  Synthroid 112 mcg (0.112 mg) oral tablet: 1 tab(s) orally once a day  Zocor 20 mg oral tablet: 1 tab(s) orally once a day (at bedtime)   Eliquis 5 mg oral tablet: 1 tab(s) orally 2 times a day  Entresto 24 mg-26 mg oral tablet: 1 tab(s) orally 2 times a day  meclizine 12.5 mg oral tablet: 1 tab(s) orally every 6 hours as needed for Dizziness  metoprolol succinate 25 mg oral tablet, extended release: 1 tab(s) orally once a day  pantoprazole 40 mg oral delayed release tablet: 1 tab(s) orally once a day (before a meal)  Synthroid 112 mcg (0.112 mg) oral tablet: 1 tab(s) orally once a day  Zocor 20 mg oral tablet: 1 tab(s) orally once a day (at bedtime)   Eliquis 5 mg oral tablet: 1 tab(s) orally 2 times a day  Entresto 24 mg-26 mg oral tablet: 1 tab(s) orally 2 times a day  meclizine 12.5 mg oral tablet: 1 tab(s) orally every 6 hours as needed for Dizziness  metoprolol succinate 25 mg oral tablet, extended release: 1 tab(s) orally once a day  pantoprazole 40 mg oral delayed release tablet: 1 tab(s) orally once a day (before a meal)  Synthroid 112 mcg (0.112 mg) oral tablet: 1 tab(s) orally once a day  Vestibular PT: 2-3 x a week  Zocor 20 mg oral tablet: 1 tab(s) orally once a day (at bedtime)

## 2024-02-07 NOTE — DISCHARGE NOTE NURSING/CASE MANAGEMENT/SOCIAL WORK - NSDCPEFALRISK_GEN_ALL_CORE
For information on Fall & Injury Prevention, visit: [email or web address] OR  [email or web address] OR  https://www.cdc.gov/steadi/patient.html

## 2024-02-07 NOTE — CHART NOTE - NSCHARTNOTEFT_GEN_A_CORE
Provider called Neurology to confirm discharge clearance. Neurology recommendation MRI head be completed prior to discharge. Patient's son spoke with Dr. Robertson. He is deferring MRI at this time and plans to have patient follow up outpatient.

## 2024-02-07 NOTE — DISCHARGE NOTE PROVIDER - HOSPITAL COURSE
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   ·  Plan: suspect peripheral etiology  -CTH and CTA headache/neck no acute pathology or high grade stenosis. 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 low threshold to obtain further neuro imaging (eg, MRI brain) if new neuro deficits or prolonged symptoms  PT eval reviewed.     Problem/Plan - 2:  ·  Problem: Chronic atrial fibrillation.   ·  Plan: EKG afib rate controlled  -cont metoprolol, apixaban   < from: TTE W or WO Ultrasound Enhancing Agent (02.05.24 @ 09:42) >       1. Left ventricular systolic function is normal with an ejection fraction of 64 % by Alexander's method of disks.   2. Normal right ventricular cavity size, wall thickness, and normal systolic function. Tricuspid annular plane systolic excursion (TAPSE) is 2.0 cm (normal >=1.7 cm).   3. The left atrium is moderately dilated.    < end of copied text >.     Problem/Plan - 3:  ·  Problem: Hypothyroidism.   ·  Plan: -c/w synthroid.     Problem/Plan - 4:  ·  Problem: Chronic HFrEF (heart failure with reduced ejection fraction).   ·  Plan: currently euvolemic   -c/w metoprolol, Entresto  -echo.     Problem/Plan - 5:  ·  Problem: Prophylactic measure.   ·  Plan: -on eliquis.       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   suspect peripheral etiology  CTH and CTA headache/neck no acute pathology or high grade stenosis. 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   EKG afib rate controlled  continue metoprolol, apixaban   < from: TTE W or WO Ultrasound Enhancing Agent (02.05.24 @ 09:42) >   1. Left ventricular systolic function is normal with an ejection fraction of 64 % by Alexander's method of disks.   2. Normal right ventricular cavity size, wall thickness, and normal systolic function. Tricuspid annular plane systolic excursion (TAPSE) is 2.0 cm (normal >=1.7 cm).   3. The left atrium is moderately dilated.    < end of copied text >.     Hypothyroidism.   c/w synthroid.    Chronic HFrEF (heart failure with reduced ejection fraction).   currently euvolemic   c/w metoprolol, Entresto    Prophylactic measure.   on eliquis.    Hospital course discussed with medical attending. Patient is medically stable for discharge home.   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   suspect peripheral etiology  CTH and CTA headache/neck no acute pathology or high grade stenosis. 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   EKG afib rate controlled  continue metoprolol, apixaban   < from: TTE W or WO Ultrasound Enhancing Agent (02.05.24 @ 09:42) >   1. Left ventricular systolic function is normal with an ejection fraction of 64 % by Alexander's method of disks.   2. Normal right ventricular cavity size, wall thickness, and normal systolic function. Tricuspid annular plane systolic excursion (TAPSE) is 2.0 cm (normal >=1.7 cm).   3. The left atrium is moderately dilated.  < end of copied text >.    Hypothyroidism.   c/w synthroid.    Chronic HFrEF (heart failure with reduced ejection fraction).   currently euvolemic   c/w metoprolol, Entresto    Prophylactic measure.   on eliquis.    Hospital course discussed with medical attending. Patient is medically stable for discharge home.

## 2024-02-07 NOTE — DISCHARGE NOTE PROVIDER - NSDCCPCAREPLAN_GEN_ALL_CORE_FT
PRINCIPAL DISCHARGE DIAGNOSIS  Diagnosis: Dizziness  Assessment and Plan of Treatment: You had a CT of your head  MPRESSION:  CT head:  1.  No acute findings.  CT angiogram head:  1. 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  2. No proximal large vessel occlusions or high-grade vascular stenoses.  CT angiogram neck:  1.  No high-grade stenoses or dissections. Widely patent anterior and   posterior circulation. Minimal calcified plaque in the carotids.  2.  Aneurysmal aorta approximately 4 cm diameter of the ascending aorta and 3 cm diameter descending aorta.  You were seen by the neurology team. Recommendation to follow up with Neurosurgery outpatient   MRI outpatient        SECONDARY DISCHARGE DIAGNOSES  Diagnosis: Vertigo  Assessment and Plan of Treatment: You were seen by the neurology team during this admision  See ENT outpatient  Vestibular PT    Diagnosis: Hypothyroidism  Assessment and Plan of Treatment: Continue synthroid    Diagnosis: Chronic HFrEF (heart failure with reduced ejection fraction)  Assessment and Plan of Treatment: continue metoprolol and entresto    Diagnosis: Chronic atrial fibrillation  Assessment and Plan of Treatment: Please continue your medications as directed and follow-up with your primary provider/cardiologist to further manage your care.   Monitor for signs/symptoms of uncontrolled atrial fibrillation, such as, increased heart rate, palpitations, chest pain, dizziness, or shortness of breath - Return to emergency room if these signs/symptoms are present.       PRINCIPAL DISCHARGE DIAGNOSIS  Diagnosis: Dizziness  Assessment and Plan of Treatment: You had a CT of your head  MPRESSION:  CT head:  1.  No acute findings.  CT angiogram head:  1. 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  2. No proximal large vessel occlusions or high-grade vascular stenoses.  CT angiogram neck:  1.  No high-grade stenoses or dissections. Widely patent anterior and   posterior circulation. Minimal calcified plaque in the carotids.  2.  Aneurysmal aorta approximately 4 cm diameter of the ascending aorta and 3 cm diameter descending aorta.  You were seen by the neurology team. Recommendation to follow up with Neurosurgery outpatient   You deferred completing MRI inpatient. Please obtain MRI outpatient as recommended        SECONDARY DISCHARGE DIAGNOSES  Diagnosis: Vertigo  Assessment and Plan of Treatment: You were seen by the neurology team during this admision  See ENT outpatient  Vestibular PT  Continue meclizine as needed.    Diagnosis: Hypothyroidism  Assessment and Plan of Treatment: Continue synthroid    Diagnosis: Chronic HFrEF (heart failure with reduced ejection fraction)  Assessment and Plan of Treatment: continue metoprolol and entresto    Diagnosis: Chronic atrial fibrillation  Assessment and Plan of Treatment: Please continue your medications as directed and follow-up with your primary provider/cardiologist to further manage your care.   Monitor for signs/symptoms of uncontrolled atrial fibrillation, such as, increased heart rate, palpitations, chest pain, dizziness, or shortness of breath - Return to emergency room if these signs/symptoms are present.

## 2024-03-05 ENCOUNTER — INPATIENT (INPATIENT)
Facility: HOSPITAL | Age: 89
LOS: 2 days | Discharge: HOME | DRG: 690 | End: 2024-03-08
Attending: INTERNAL MEDICINE | Admitting: INTERNAL MEDICINE
Payer: MEDICARE

## 2024-03-05 ENCOUNTER — EMERGENCY (EMERGENCY)
Facility: HOSPITAL | Age: 89
LOS: 1 days | Discharge: HOME | End: 2024-03-05
Attending: EMERGENCY MEDICINE
Payer: MEDICARE

## 2024-03-05 VITALS
TEMPERATURE: 98 F | DIASTOLIC BLOOD PRESSURE: 74 MMHG | HEART RATE: 59 BPM | SYSTOLIC BLOOD PRESSURE: 110 MMHG | RESPIRATION RATE: 18 BRPM | WEIGHT: 125 LBS | OXYGEN SATURATION: 95 % | HEIGHT: 63 IN

## 2024-03-05 VITALS
OXYGEN SATURATION: 97 % | HEIGHT: 63 IN | RESPIRATION RATE: 18 BRPM | DIASTOLIC BLOOD PRESSURE: 98 MMHG | SYSTOLIC BLOOD PRESSURE: 156 MMHG | WEIGHT: 149.91 LBS | HEART RATE: 67 BPM | TEMPERATURE: 97 F

## 2024-03-05 VITALS
OXYGEN SATURATION: 96 % | RESPIRATION RATE: 18 BRPM | TEMPERATURE: 98 F | SYSTOLIC BLOOD PRESSURE: 138 MMHG | DIASTOLIC BLOOD PRESSURE: 79 MMHG | HEART RATE: 63 BPM

## 2024-03-05 LAB
ALBUMIN SERPL ELPH-MCNC: 3.8 G/DL — SIGNIFICANT CHANGE UP (ref 3.3–5)
ALP SERPL-CCNC: 46 U/L — SIGNIFICANT CHANGE UP (ref 40–120)
ALT FLD-CCNC: 15 U/L — SIGNIFICANT CHANGE UP (ref 10–45)
ANION GAP SERPL CALC-SCNC: 13 MMOL/L — SIGNIFICANT CHANGE UP (ref 5–17)
APPEARANCE UR: ABNORMAL
AST SERPL-CCNC: 17 U/L — SIGNIFICANT CHANGE UP (ref 10–40)
BACTERIA # UR AUTO: NEGATIVE /HPF — SIGNIFICANT CHANGE UP
BASE EXCESS BLDV CALC-SCNC: -0.8 MMOL/L — SIGNIFICANT CHANGE UP (ref -2–3)
BASOPHILS # BLD AUTO: 0.02 K/UL — SIGNIFICANT CHANGE UP (ref 0–0.2)
BASOPHILS NFR BLD AUTO: 0.3 % — SIGNIFICANT CHANGE UP (ref 0–2)
BILIRUB SERPL-MCNC: 1.4 MG/DL — HIGH (ref 0.2–1.2)
BILIRUB UR-MCNC: NEGATIVE — SIGNIFICANT CHANGE UP
BUN SERPL-MCNC: 19 MG/DL — SIGNIFICANT CHANGE UP (ref 7–23)
CA-I SERPL-SCNC: 1.21 MMOL/L — SIGNIFICANT CHANGE UP (ref 1.15–1.33)
CALCIUM SERPL-MCNC: 9.8 MG/DL — SIGNIFICANT CHANGE UP (ref 8.4–10.5)
CAST: 0 /LPF — SIGNIFICANT CHANGE UP (ref 0–4)
CHLORIDE BLDV-SCNC: 106 MMOL/L — SIGNIFICANT CHANGE UP (ref 96–108)
CHLORIDE SERPL-SCNC: 106 MMOL/L — SIGNIFICANT CHANGE UP (ref 96–108)
CO2 BLDV-SCNC: 26 MMOL/L — SIGNIFICANT CHANGE UP (ref 22–26)
CO2 SERPL-SCNC: 22 MMOL/L — SIGNIFICANT CHANGE UP (ref 22–31)
COLOR SPEC: YELLOW — SIGNIFICANT CHANGE UP
CREAT SERPL-MCNC: 0.8 MG/DL — SIGNIFICANT CHANGE UP (ref 0.5–1.3)
DIFF PNL FLD: ABNORMAL
EGFR: 69 ML/MIN/1.73M2 — SIGNIFICANT CHANGE UP
EOSINOPHIL # BLD AUTO: 0.02 K/UL — SIGNIFICANT CHANGE UP (ref 0–0.5)
EOSINOPHIL NFR BLD AUTO: 0.3 % — SIGNIFICANT CHANGE UP (ref 0–6)
GAS PNL BLDV: 137 MMOL/L — SIGNIFICANT CHANGE UP (ref 136–145)
GAS PNL BLDV: SIGNIFICANT CHANGE UP
GAS PNL BLDV: SIGNIFICANT CHANGE UP
GLUCOSE BLDV-MCNC: 144 MG/DL — HIGH (ref 70–99)
GLUCOSE SERPL-MCNC: 149 MG/DL — HIGH (ref 70–99)
GLUCOSE UR QL: NEGATIVE MG/DL — SIGNIFICANT CHANGE UP
HCO3 BLDV-SCNC: 25 MMOL/L — SIGNIFICANT CHANGE UP (ref 22–29)
HCT VFR BLD CALC: 36.1 % — SIGNIFICANT CHANGE UP (ref 34.5–45)
HCT VFR BLDA CALC: 37 % — SIGNIFICANT CHANGE UP (ref 34.5–46.5)
HGB BLD CALC-MCNC: 12.4 G/DL — SIGNIFICANT CHANGE UP (ref 11.7–16.1)
HGB BLD-MCNC: 11.7 G/DL — SIGNIFICANT CHANGE UP (ref 11.5–15.5)
IMM GRANULOCYTES NFR BLD AUTO: 0.1 % — SIGNIFICANT CHANGE UP (ref 0–0.9)
KETONES UR-MCNC: ABNORMAL MG/DL
LACTATE BLDV-MCNC: 2.4 MMOL/L — HIGH (ref 0.5–2)
LEUKOCYTE ESTERASE UR-ACNC: ABNORMAL
LIDOCAIN IGE QN: 24 U/L — SIGNIFICANT CHANGE UP (ref 7–60)
LYMPHOCYTES # BLD AUTO: 0.65 K/UL — LOW (ref 1–3.3)
LYMPHOCYTES # BLD AUTO: 9.1 % — LOW (ref 13–44)
MCHC RBC-ENTMCNC: 32.1 PG — SIGNIFICANT CHANGE UP (ref 27–34)
MCHC RBC-ENTMCNC: 32.4 GM/DL — SIGNIFICANT CHANGE UP (ref 32–36)
MCV RBC AUTO: 98.9 FL — SIGNIFICANT CHANGE UP (ref 80–100)
MONOCYTES # BLD AUTO: 0.38 K/UL — SIGNIFICANT CHANGE UP (ref 0–0.9)
MONOCYTES NFR BLD AUTO: 5.3 % — SIGNIFICANT CHANGE UP (ref 2–14)
NEUTROPHILS # BLD AUTO: 6.03 K/UL — SIGNIFICANT CHANGE UP (ref 1.8–7.4)
NEUTROPHILS NFR BLD AUTO: 84.9 % — HIGH (ref 43–77)
NITRITE UR-MCNC: NEGATIVE — SIGNIFICANT CHANGE UP
NRBC # BLD: 0 /100 WBCS — SIGNIFICANT CHANGE UP (ref 0–0)
PCO2 BLDV: 44 MMHG — HIGH (ref 39–42)
PH BLDV: 7.36 — SIGNIFICANT CHANGE UP (ref 7.32–7.43)
PH UR: 5.5 — SIGNIFICANT CHANGE UP (ref 5–8)
PLATELET # BLD AUTO: 176 K/UL — SIGNIFICANT CHANGE UP (ref 150–400)
PO2 BLDV: 28 MMHG — SIGNIFICANT CHANGE UP (ref 25–45)
POTASSIUM BLDV-SCNC: 5.4 MMOL/L — HIGH (ref 3.5–5.1)
POTASSIUM SERPL-MCNC: 4.3 MMOL/L — SIGNIFICANT CHANGE UP (ref 3.5–5.3)
POTASSIUM SERPL-SCNC: 4.3 MMOL/L — SIGNIFICANT CHANGE UP (ref 3.5–5.3)
PROT SERPL-MCNC: 7 G/DL — SIGNIFICANT CHANGE UP (ref 6–8.3)
PROT UR-MCNC: 30 MG/DL
RBC # BLD: 3.65 M/UL — LOW (ref 3.8–5.2)
RBC # FLD: 14.1 % — SIGNIFICANT CHANGE UP (ref 10.3–14.5)
RBC CASTS # UR COMP ASSIST: 499 /HPF — HIGH (ref 0–4)
SAO2 % BLDV: 46.1 % — LOW (ref 67–88)
SODIUM SERPL-SCNC: 141 MMOL/L — SIGNIFICANT CHANGE UP (ref 135–145)
SP GR SPEC: 1.02 — SIGNIFICANT CHANGE UP (ref 1–1.03)
SQUAMOUS # UR AUTO: 2 /HPF — SIGNIFICANT CHANGE UP (ref 0–5)
TROPONIN T, HIGH SENSITIVITY RESULT: 50 NG/L — SIGNIFICANT CHANGE UP (ref 0–51)
UROBILINOGEN FLD QL: 0.2 MG/DL — SIGNIFICANT CHANGE UP (ref 0.2–1)
WBC # BLD: 7.11 K/UL — SIGNIFICANT CHANGE UP (ref 3.8–10.5)
WBC # FLD AUTO: 7.11 K/UL — SIGNIFICANT CHANGE UP (ref 3.8–10.5)
WBC UR QL: 17 /HPF — HIGH (ref 0–5)

## 2024-03-05 PROCEDURE — 99285 EMERGENCY DEPT VISIT HI MDM: CPT | Mod: GC

## 2024-03-05 PROCEDURE — 74177 CT ABD & PELVIS W/CONTRAST: CPT | Mod: 26,MC

## 2024-03-05 PROCEDURE — 99285 EMERGENCY DEPT VISIT HI MDM: CPT

## 2024-03-05 RX ORDER — KETOROLAC TROMETHAMINE 30 MG/ML
15 SYRINGE (ML) INJECTION ONCE
Refills: 0 | Status: DISCONTINUED | OUTPATIENT
Start: 2024-03-05 | End: 2024-03-05

## 2024-03-05 RX ORDER — ACETAMINOPHEN 500 MG
1000 TABLET ORAL ONCE
Refills: 0 | Status: COMPLETED | OUTPATIENT
Start: 2024-03-05 | End: 2024-03-05

## 2024-03-05 RX ORDER — MORPHINE SULFATE 50 MG/1
4 CAPSULE, EXTENDED RELEASE ORAL ONCE
Refills: 0 | Status: DISCONTINUED | OUTPATIENT
Start: 2024-03-05 | End: 2024-03-05

## 2024-03-05 RX ORDER — ONDANSETRON 8 MG/1
4 TABLET, FILM COATED ORAL ONCE
Refills: 0 | Status: COMPLETED | OUTPATIENT
Start: 2024-03-05 | End: 2024-03-05

## 2024-03-05 RX ORDER — MECLIZINE HCL 12.5 MG
12.5 TABLET ORAL ONCE
Refills: 0 | Status: COMPLETED | OUTPATIENT
Start: 2024-03-05 | End: 2024-03-05

## 2024-03-05 RX ORDER — SODIUM CHLORIDE 9 MG/ML
1000 INJECTION INTRAMUSCULAR; INTRAVENOUS; SUBCUTANEOUS ONCE
Refills: 0 | Status: COMPLETED | OUTPATIENT
Start: 2024-03-05 | End: 2024-03-05

## 2024-03-05 RX ADMIN — Medication 12.5 MILLIGRAM(S): at 05:59

## 2024-03-05 RX ADMIN — SODIUM CHLORIDE 1000 MILLILITER(S): 9 INJECTION INTRAMUSCULAR; INTRAVENOUS; SUBCUTANEOUS at 02:55

## 2024-03-05 RX ADMIN — ONDANSETRON 4 MILLIGRAM(S): 8 TABLET, FILM COATED ORAL at 05:26

## 2024-03-05 RX ADMIN — ONDANSETRON 4 MILLIGRAM(S): 8 TABLET, FILM COATED ORAL at 02:55

## 2024-03-05 NOTE — ED ADULT NURSE NOTE - OBJECTIVE STATEMENT
89y F A&Ox4 BIBEMS for abdominal pain. As per EMS pt had dinner around 8pm yesterday and then around 11pm pt had the sudden onset of nausea and dizziness.  Pt Pt was sticking her fingers down her throat to make herself vomit as per 24 hour live in aide. Pt called her son in [place] to come and told him she was in pain. Son called ambulance and pt transported to [facility] for evaluation. Additionally pt has hx of vertigo on Meclizine however has been weened off with the use of Thearpy.  Pt states that all of the GI issues began after abdominal radiation. Upon assessment pt well appearing however endorsing mild dizziness and nausea. Pt abdomen non tender to palpation. Denies any Fever, cough, D/CP/SOB/Gu symptoms. PMH of HTN, Ovarian Ca, Breast Ca, chronic constipation. PSH of Hysterectomy. VSS

## 2024-03-05 NOTE — ED PROVIDER NOTE - PATIENT PORTAL LINK FT
You can access the FollowMyHealth Patient Portal offered by [facility] by registering at the following website: [email or web address]. By joining [facility]’s FollowMyHealth portal, you will also be able to view your health information using other applications (apps) compatible with our system.

## 2024-03-05 NOTE — ED PROVIDER NOTE - PHYSICAL EXAMINATION
GENERAL: NAD  HEENT:  Atraumatic  CHEST/LUNG: Chest rise equal bilaterally, clear breath sounds b/l  HEART: Regular rate and rhythm  ABDOMEN: Soft, Nontender, Nondistended  EXTREMITIES:  Extremities warm  PSYCH: A&Ox3  SKIN: No obvious rashes or lesions

## 2024-03-05 NOTE — ED PROVIDER NOTE - DIFFERENTIAL DIAGNOSIS
Differential Diagnosis Ddx includes, however, is not limited to: food poisoning, gastroenteritis, uti, pyelo, kidney stone, other

## 2024-03-05 NOTE — ED PROVIDER NOTE - CLINICAL SUMMARY MEDICAL DECISION MAKING FREE TEXT BOX
SBO vs. volvulus vs. UTI  Basic labs, lipase   CT abdomen/pelvis to r/o SBO, volvulus  ivf and zofran for symptomatic tx  well appearing, afebrile, HD stable SBO vs. volvulus vs. UTI vs. kidney stone  Basic labs, lipase   CT abdomen/pelvis to r/o SBO, volvulus  ivf and zofran for symptomatic tx  well appearing, afebrile, HD stable SBO vs. volvulus vs. UTI vs. kidney stone  Basic labs, lipase   CT abdomen/pelvis to r/o SBO, volvulus  ivf and zofran for symptomatic tx  well appearing, afebrile, HD stable    SHARON Russell MD: Agree with resident/ACP MDM, assessment and plan as above.

## 2024-03-05 NOTE — ED PROVIDER NOTE - NSFOLLOWUPCLINICSTOKEN_GEN_ALL_ED_FT
[id number]: || ||00\01||False;[id number]: || ||00\01||False;[id number]: || ||00\01||False;[id number]: || ||00\01||False;[id number]: || ||00\01||False;

## 2024-03-05 NOTE — ED ADULT NURSE REASSESSMENT NOTE - NS ED NURSE REASSESS COMMENT FT1
assisted to br via wc to void; obtained urine spec by clean catch midstream method; urine cloudy, light brown.

## 2024-03-05 NOTE — ED ADULT NURSE NOTE - NSFALLUNIVINTERV_ED_ALL_ED
Bed/Stretcher in lowest position, wheels locked, appropriate side rails in place/Call bell, personal items and telephone in reach/Instruct patient to call for assistance before getting out of bed/chair/stretcher/Non-slip footwear applied when patient is off stretcher/[place] to call system/Physically safe environment - no spills, clutter or unnecessary equipment/Purposeful proactive rounding/Room/bathroom lighting operational, light cord in reach

## 2024-03-05 NOTE — ED PROVIDER NOTE - OBJECTIVE STATEMENT
89-year-old female patient past medical history breast cancer (mastectomy), uterine cancer (hysterectomy about 20 years ago), chronic constipation from hysterectomy (follows with GI specialist), hyperlipidemia brought in by son complains of left lower quadrant abdominal pain and nausea after eating chicken at 5 PM today.  Patient's 24/7 aide ate the same thing for dinner with no symptoms.  Denies fevers, bloody stool, history of IBD.  Patient is last normal bowel movement was yesterday.  Denies flatus.

## 2024-03-05 NOTE — ED ADULT TRIAGE NOTE - RESPIRATORY RATE (BREATHS/MIN)
Attempted to call dad back  Rings once and goes to   Left message stating that we would try again shortly 
Regarding: Fever   ----- Message from Liliana Simon sent at 8/1/2020  8:10 AM EDT -----  Patient's father called stating the patient woke up with a fever  Please call patient's father back to advise 
18
denies

## 2024-03-05 NOTE — ED PROVIDER NOTE - NSFOLLOWUPCLINICS_GEN_ALL_ED_FT
Statement Selected
[facility]  Urology  [address] [place] [address]  Phone: [phone]  Fax:     [facility]  Urolog  [address] [place] [address], [place] [address]  Phone: [phone]  Fax:     [facility]  Urolog  [address] [place] [address]  Phone: [phone]  Fax:     [facility] [place]  Urolog[address] [address]  Phone: [phone]  Fax:     [facility] - [facility]  Urology  [address], 3rd & 4th [place] [address]  Phone: [phone]  Fax:

## 2024-03-05 NOTE — ED PROVIDER NOTE - NSFOLLOWUPINSTRUCTIONS_ED_ALL_ED_FT
Kidney Stones  The urinary tract with a close-up of a kidney showing kidney stones.  Kidney stones are rock-like masses that form inside of the kidneys. Kidneys are organs that make pee (urine). A kidney stone may move into other parts of the urinary tract, including:  The tubes that connect the kidneys to the bladder (ureters).  The bladder.  The tube that carries urine out of the body (urethra).  Kidney stones can cause very bad pain and can block the flow of pee. The stone usually leaves your body through your pee. A doctor may need to take out the stone.    What are the causes?  Kidney stones may be caused by:  Too much calcium in the body. This may be caused by too much parathyroid hormone in the blood.  Uric acid crystals in the bladder. The body makes uric acid when you eat certain foods.  Narrowing of one or both of the ureters.  A kidney blockage that you were born with.  Past surgery on the kidney or the ureters.  What increases the risk?  You are more likely to develop this condition if:  You have had a kidney stone in the past.  Other people in your family have had kidney stones.  You do not drink enough water.  You eat a diet that is high in protein, salt (sodium), or sugar.  You are very overweight (obese).  What are the signs or symptoms?  Symptoms of a kidney stone may include:  Pain in the side of the belly, right below the ribs. Pain usually spreads to the groin.  Needing to pee often or right away.  Pain when peeing.  Blood in your pee.  Feeling like you may vomit (nauseous).  Vomiting.  Fever and chills.  How is this treated?  Treatment depends on the size, location, and makeup of the kidney stones. The stones will often pass out of the body when you pee. You may need to:  Drink more fluid to help pass the stone.  In some cases, you may be given fluids through an IV tube at the hospital.  Take medicine for pain.  Change your diet to help keep kidney stones from coming back.  Sometimes, you may need:  A procedure to break up kidney stones using a beam of light (laser) or shock waves.  Surgery to remove the kidney stones.  Follow these instructions at home:  Medicines    Take over-the-counter and prescription medicines only as told by your doctor.  Ask your doctor if the medicine prescribed to you requires you to avoid driving or using machinery.  Eating and drinking    Drink enough fluid to keep your pee pale yellow.  You may be told to drink at least 8–10 glasses of water each day. This will help you pass the stone.  If told by your doctor, change your diet. You may be told to:  Limit how much salt you eat.  Eat more fruits and vegetables.  Limit how much meat, poultry, fish, and eggs you eat.  Follow instructions from your doctor about what you may eat and drink.  General instructions    Collect pee samples as told by your doctor. You may need to collect a pee sample:  24 hours after a stone comes out.  8–12 weeks after a stone comes out, and every 6–12 months after that.  Strain your pee every time you pee. Use the strainer that your doctor recommends.  Do not throw out the stone. Keep it so that it can be tested by your doctor.  Keep all follow-up visits. You may need X-rays and ultrasounds to make sure the stone has come out.  How is this prevented?  To prevent another kidney stone:  Drink enough fluid to keep your pee pale yellow. This is the best way to prevent kidney stones.  Eat healthy foods.  Avoid certain foods as told by your doctor. You may be told to eat less protein.  Stay at a healthy weight.  Where to find more information  National Kidney Foundation (NKF): kidney.org  [employer] (UCF): urologyhealth.org  Contact a doctor if:  You have pain that gets worse or does not get better with medicine.  Get help right away if:  You have a fever or chills.  You get very bad pain.  You get new pain in your belly.  You faint.  You cannot pee.  This information is not intended to replace advice given to you by your health care provider. Make sure you discuss any questions you have with your health care provider.

## 2024-03-06 DIAGNOSIS — E03.9 HYPOTHYROIDISM, UNSPECIFIED: ICD-10-CM

## 2024-03-06 DIAGNOSIS — N20.0 CALCULUS OF KIDNEY: ICD-10-CM

## 2024-03-06 DIAGNOSIS — I10 ESSENTIAL (PRIMARY) HYPERTENSION: ICD-10-CM

## 2024-03-06 DIAGNOSIS — I50.9 HEART FAILURE, UNSPECIFIED: ICD-10-CM

## 2024-03-06 DIAGNOSIS — I48.20 CHRONIC ATRIAL FIBRILLATION, UNSPECIFIED: ICD-10-CM

## 2024-03-06 DIAGNOSIS — E78.5 HYPERLIPIDEMIA, UNSPECIFIED: ICD-10-CM

## 2024-03-06 DIAGNOSIS — R42 DIZZINESS AND GIDDINESS: ICD-10-CM

## 2024-03-06 DIAGNOSIS — Z29.9 ENCOUNTER FOR PROPHYLACTIC MEASURES, UNSPECIFIED: ICD-10-CM

## 2024-03-06 LAB
ALBUMIN SERPL ELPH-MCNC: 3.8 G/DL — SIGNIFICANT CHANGE UP (ref 3.3–5)
ALP SERPL-CCNC: 51 U/L — SIGNIFICANT CHANGE UP (ref 40–120)
ALT FLD-CCNC: 18 U/L — SIGNIFICANT CHANGE UP (ref 10–45)
ANION GAP SERPL CALC-SCNC: 13 MMOL/L — SIGNIFICANT CHANGE UP (ref 5–17)
APPEARANCE UR: CLEAR — SIGNIFICANT CHANGE UP
AST SERPL-CCNC: 20 U/L — SIGNIFICANT CHANGE UP (ref 10–40)
BACTERIA # UR AUTO: NEGATIVE /HPF — SIGNIFICANT CHANGE UP
BASOPHILS # BLD AUTO: 0.02 K/UL — SIGNIFICANT CHANGE UP (ref 0–0.2)
BASOPHILS NFR BLD AUTO: 0.3 % — SIGNIFICANT CHANGE UP (ref 0–2)
BILIRUB SERPL-MCNC: 1.8 MG/DL — HIGH (ref 0.2–1.2)
BILIRUB UR-MCNC: NEGATIVE — SIGNIFICANT CHANGE UP
BUN SERPL-MCNC: 20 MG/DL — SIGNIFICANT CHANGE UP (ref 7–23)
CALCIUM SERPL-MCNC: 9.9 MG/DL — SIGNIFICANT CHANGE UP (ref 8.4–10.5)
CAST: 2 /LPF — SIGNIFICANT CHANGE UP (ref 0–4)
CHLORIDE SERPL-SCNC: 104 MMOL/L — SIGNIFICANT CHANGE UP (ref 96–108)
CO2 SERPL-SCNC: 21 MMOL/L — LOW (ref 22–31)
COLOR SPEC: SIGNIFICANT CHANGE UP
CREAT SERPL-MCNC: 0.97 MG/DL — SIGNIFICANT CHANGE UP (ref 0.5–1.3)
DIFF PNL FLD: ABNORMAL
EGFR: 54 ML/MIN/1.73M2 — LOW
EOSINOPHIL # BLD AUTO: 0.01 K/UL — SIGNIFICANT CHANGE UP (ref 0–0.5)
EOSINOPHIL NFR BLD AUTO: 0.2 % — SIGNIFICANT CHANGE UP (ref 0–6)
GLUCOSE SERPL-MCNC: 123 MG/DL — HIGH (ref 70–99)
GLUCOSE UR QL: NEGATIVE MG/DL — SIGNIFICANT CHANGE UP
HCT VFR BLD CALC: 34.9 % — SIGNIFICANT CHANGE UP (ref 34.5–45)
HGB BLD-MCNC: 11.7 G/DL — SIGNIFICANT CHANGE UP (ref 11.5–15.5)
IMM GRANULOCYTES NFR BLD AUTO: 0.3 % — SIGNIFICANT CHANGE UP (ref 0–0.9)
KETONES UR-MCNC: ABNORMAL MG/DL
LEUKOCYTE ESTERASE UR-ACNC: NEGATIVE — SIGNIFICANT CHANGE UP
LYMPHOCYTES # BLD AUTO: 1.1 K/UL — SIGNIFICANT CHANGE UP (ref 1–3.3)
LYMPHOCYTES # BLD AUTO: 18.2 % — SIGNIFICANT CHANGE UP (ref 13–44)
MCHC RBC-ENTMCNC: 32.5 PG — SIGNIFICANT CHANGE UP (ref 27–34)
MCHC RBC-ENTMCNC: 33.5 GM/DL — SIGNIFICANT CHANGE UP (ref 32–36)
MCV RBC AUTO: 96.9 FL — SIGNIFICANT CHANGE UP (ref 80–100)
MONOCYTES # BLD AUTO: 0.7 K/UL — SIGNIFICANT CHANGE UP (ref 0–0.9)
MONOCYTES NFR BLD AUTO: 11.6 % — SIGNIFICANT CHANGE UP (ref 2–14)
NEUTROPHILS # BLD AUTO: 4.21 K/UL — SIGNIFICANT CHANGE UP (ref 1.8–7.4)
NEUTROPHILS NFR BLD AUTO: 69.4 % — SIGNIFICANT CHANGE UP (ref 43–77)
NITRITE UR-MCNC: NEGATIVE — SIGNIFICANT CHANGE UP
NRBC # BLD: 0 /100 WBCS — SIGNIFICANT CHANGE UP (ref 0–0)
PH UR: 5 — SIGNIFICANT CHANGE UP (ref 5–8)
PLATELET # BLD AUTO: 188 K/UL — SIGNIFICANT CHANGE UP (ref 150–400)
POTASSIUM SERPL-MCNC: 4.5 MMOL/L — SIGNIFICANT CHANGE UP (ref 3.5–5.3)
POTASSIUM SERPL-SCNC: 4.5 MMOL/L — SIGNIFICANT CHANGE UP (ref 3.5–5.3)
PROT SERPL-MCNC: 7 G/DL — SIGNIFICANT CHANGE UP (ref 6–8.3)
PROT UR-MCNC: 30 MG/DL
RBC # BLD: 3.6 M/UL — LOW (ref 3.8–5.2)
RBC # FLD: 14.3 % — SIGNIFICANT CHANGE UP (ref 10.3–14.5)
RBC CASTS # UR COMP ASSIST: 57 /HPF — HIGH (ref 0–4)
REVIEW: SIGNIFICANT CHANGE UP
SODIUM SERPL-SCNC: 138 MMOL/L — SIGNIFICANT CHANGE UP (ref 135–145)
SP GR SPEC: >1.03 — HIGH (ref 1–1.03)
SQUAMOUS # UR AUTO: 1 /HPF — SIGNIFICANT CHANGE UP (ref 0–5)
UROBILINOGEN FLD QL: 0.2 MG/DL — SIGNIFICANT CHANGE UP (ref 0.2–1)
WBC # BLD: 6.06 K/UL — SIGNIFICANT CHANGE UP (ref 3.8–10.5)
WBC # FLD AUTO: 6.06 K/UL — SIGNIFICANT CHANGE UP (ref 3.8–10.5)
WBC UR QL: 28 /HPF — HIGH (ref 0–5)

## 2024-03-06 PROCEDURE — 99222 1ST HOSP IP/OBS MODERATE 55: CPT

## 2024-03-06 PROCEDURE — 99223 1ST HOSP IP/OBS HIGH 75: CPT

## 2024-03-06 RX ORDER — KETOROLAC TROMETHAMINE 30 MG/ML
30 SYRINGE (ML) INJECTION ONCE
Refills: 0 | Status: DISCONTINUED | OUTPATIENT
Start: 2024-03-06 | End: 2024-03-06

## 2024-03-06 RX ORDER — SODIUM CHLORIDE 9 MG/ML
1000 INJECTION, SOLUTION INTRAVENOUS
Refills: 0 | Status: DISCONTINUED | OUTPATIENT
Start: 2024-03-06 | End: 2024-03-08

## 2024-03-06 RX ORDER — PANTOPRAZOLE SODIUM 20 MG/1
40 TABLET, DELAYED RELEASE ORAL
Refills: 0 | Status: DISCONTINUED | OUTPATIENT
Start: 2024-03-06 | End: 2024-03-08

## 2024-03-06 RX ORDER — ONDANSETRON 8 MG/1
4 TABLET, FILM COATED ORAL EVERY 8 HOURS
Refills: 0 | Status: DISCONTINUED | OUTPATIENT
Start: 2024-03-06 | End: 2024-03-08

## 2024-03-06 RX ORDER — KETOROLAC TROMETHAMINE 30 MG/ML
15 SYRINGE (ML) INJECTION ONCE
Refills: 0 | Status: DISCONTINUED | OUTPATIENT
Start: 2024-03-06 | End: 2024-03-06

## 2024-03-06 RX ORDER — ONDANSETRON 8 MG/1
4 TABLET, FILM COATED ORAL ONCE
Refills: 0 | Status: COMPLETED | OUTPATIENT
Start: 2024-03-06 | End: 2024-03-06

## 2024-03-06 RX ORDER — DEXTROSE 50 % IN WATER 50 %
25 SYRINGE (ML) INTRAVENOUS ONCE
Refills: 0 | Status: DISCONTINUED | OUTPATIENT
Start: 2024-03-06 | End: 2024-03-08

## 2024-03-06 RX ORDER — DEXTROSE 50 % IN WATER 50 %
12.5 SYRINGE (ML) INTRAVENOUS ONCE
Refills: 0 | Status: DISCONTINUED | OUTPATIENT
Start: 2024-03-06 | End: 2024-03-08

## 2024-03-06 RX ORDER — MECLIZINE HCL 12.5 MG
12.5 TABLET ORAL EVERY 6 HOURS
Refills: 0 | Status: DISCONTINUED | OUTPATIENT
Start: 2024-03-06 | End: 2024-03-08

## 2024-03-06 RX ORDER — METOPROLOL TARTRATE 50 MG
25 TABLET ORAL DAILY
Refills: 0 | Status: DISCONTINUED | OUTPATIENT
Start: 2024-03-06 | End: 2024-03-08

## 2024-03-06 RX ORDER — KETOROLAC TROMETHAMINE 30 MG/ML
30 SYRINGE (ML) INJECTION EVERY 6 HOURS
Refills: 0 | Status: DISCONTINUED | OUTPATIENT
Start: 2024-03-06 | End: 2024-03-06

## 2024-03-06 RX ORDER — TAMSULOSIN HYDROCHLORIDE 0.4 MG/1
0.4 CAPSULE ORAL AT BEDTIME
Refills: 0 | Status: DISCONTINUED | OUTPATIENT
Start: 2024-03-06 | End: 2024-03-08

## 2024-03-06 RX ORDER — SIMVASTATIN 20 MG/1
20 TABLET, FILM COATED ORAL AT BEDTIME
Refills: 0 | Status: DISCONTINUED | OUTPATIENT
Start: 2024-03-06 | End: 2024-03-08

## 2024-03-06 RX ORDER — MORPHINE SULFATE 50 MG/1
2 CAPSULE, EXTENDED RELEASE ORAL ONCE
Refills: 0 | Status: DISCONTINUED | OUTPATIENT
Start: 2024-03-06 | End: 2024-03-06

## 2024-03-06 RX ORDER — DEXTROSE 50 % IN WATER 50 %
15 SYRINGE (ML) INTRAVENOUS ONCE
Refills: 0 | Status: DISCONTINUED | OUTPATIENT
Start: 2024-03-06 | End: 2024-03-08

## 2024-03-06 RX ORDER — SODIUM CHLORIDE 9 MG/ML
1000 INJECTION INTRAMUSCULAR; INTRAVENOUS; SUBCUTANEOUS
Refills: 0 | Status: DISCONTINUED | OUTPATIENT
Start: 2024-03-06 | End: 2024-03-07

## 2024-03-06 RX ORDER — SACUBITRIL AND VALSARTAN 24; 26 MG/1; MG/1
1 TABLET, FILM COATED ORAL
Refills: 0 | Status: DISCONTINUED | OUTPATIENT
Start: 2024-03-06 | End: 2024-03-08

## 2024-03-06 RX ORDER — ACETAMINOPHEN 500 MG
650 TABLET ORAL EVERY 6 HOURS
Refills: 0 | Status: DISCONTINUED | OUTPATIENT
Start: 2024-03-06 | End: 2024-03-08

## 2024-03-06 RX ORDER — LANOLIN ALCOHOL/MO/W.PET/CERES
3 CREAM (GRAM) TOPICAL AT BEDTIME
Refills: 0 | Status: DISCONTINUED | OUTPATIENT
Start: 2024-03-06 | End: 2024-03-08

## 2024-03-06 RX ORDER — GLUCAGON INJECTION, SOLUTION 0.5 MG/.1ML
1 INJECTION, SOLUTION SUBCUTANEOUS ONCE
Refills: 0 | Status: DISCONTINUED | OUTPATIENT
Start: 2024-03-06 | End: 2024-03-08

## 2024-03-06 RX ORDER — LEVOTHYROXINE SODIUM 125 MCG
112 TABLET ORAL DAILY
Refills: 0 | Status: DISCONTINUED | OUTPATIENT
Start: 2024-03-06 | End: 2024-03-08

## 2024-03-06 RX ADMIN — TAMSULOSIN HYDROCHLORIDE 0.4 MILLIGRAM(S): 0.4 CAPSULE ORAL at 22:11

## 2024-03-06 RX ADMIN — SODIUM CHLORIDE 1000 MILLILITER(S): 9 INJECTION INTRAMUSCULAR; INTRAVENOUS; SUBCUTANEOUS at 00:53

## 2024-03-06 RX ADMIN — MORPHINE SULFATE 2 MILLIGRAM(S): 50 CAPSULE, EXTENDED RELEASE ORAL at 03:21

## 2024-03-06 RX ADMIN — ONDANSETRON 4 MILLIGRAM(S): 8 TABLET, FILM COATED ORAL at 00:58

## 2024-03-06 RX ADMIN — Medication 400 MILLIGRAM(S): at 00:58

## 2024-03-06 RX ADMIN — SIMVASTATIN 20 MILLIGRAM(S): 20 TABLET, FILM COATED ORAL at 22:11

## 2024-03-06 RX ADMIN — ONDANSETRON 4 MILLIGRAM(S): 8 TABLET, FILM COATED ORAL at 03:20

## 2024-03-06 RX ADMIN — Medication 25 MILLIGRAM(S): at 18:55

## 2024-03-06 RX ADMIN — SACUBITRIL AND VALSARTAN 1 TABLET(S): 24; 26 TABLET, FILM COATED ORAL at 18:55

## 2024-03-06 RX ADMIN — MORPHINE SULFATE 4 MILLIGRAM(S): 50 CAPSULE, EXTENDED RELEASE ORAL at 00:56

## 2024-03-06 RX ADMIN — TAMSULOSIN HYDROCHLORIDE 0.4 MILLIGRAM(S): 0.4 CAPSULE ORAL at 03:18

## 2024-03-06 RX ADMIN — ONDANSETRON 4 MILLIGRAM(S): 8 TABLET, FILM COATED ORAL at 18:55

## 2024-03-06 RX ADMIN — Medication 15 MILLIGRAM(S): at 19:08

## 2024-03-06 RX ADMIN — Medication 15 MILLIGRAM(S): at 19:43

## 2024-03-06 RX ADMIN — Medication 650 MILLIGRAM(S): at 14:26

## 2024-03-06 RX ADMIN — Medication 1000 MILLIGRAM(S): at 01:28

## 2024-03-06 RX ADMIN — MORPHINE SULFATE 2 MILLIGRAM(S): 50 CAPSULE, EXTENDED RELEASE ORAL at 12:17

## 2024-03-06 RX ADMIN — Medication 650 MILLIGRAM(S): at 16:50

## 2024-03-06 NOTE — H&P ADULT - NSHPPHYSICALEXAM_GEN_ALL_CORE
Vital Signs Last 24 Hrs  T(C): 36.6 (06 Mar 2024 18:34), Max: 36.8 (05 Mar 2024 23:28)  T(F): 97.8 (06 Mar 2024 18:34), Max: 98.2 (05 Mar 2024 23:28)  HR: 69 (06 Mar 2024 18:34) (59 - 69)  BP: 119/75 (06 Mar 2024 18:34) (107/50 - 143/87)  BP(mean): 98 (06 Mar 2024 14:36) (68 - 101)  RR: 18 (06 Mar 2024 18:34) (13 - 18)  SpO2: 95% (06 Mar 2024 18:34) (91% - 99%)    Parameters below as of 06 Mar 2024 18:34  Patient On (Oxygen Delivery Method): room air        CONSTITUTIONAL: Well-groomed, in no apparent distress, resting comfortably   EYES: No conjunctival or scleral injection, non-icteric  ENMT: normal dentition; no pharyngeal injection or exudates, oral mucosa with moist membranes  NECK: Trachea midline without palpable neck mass; nontender  RESPIRATORY: Breathing comfortably; lungs CTA without wheeze/rhonchi/rales  CARDIOVASCULAR: +S1S2, RRR, no M/G/R; pedal pulses full and symmetric; no lower extremity edema  GASTROINTESTINAL: +LLQ tenderness to palpation.   LYMPHATIC: No cervical LAD or tenderness  MUSCULOSKELETAL: no digital clubbing or cyanosis; normal strength and tone of extremities  SKIN: No rashes or ulcers noted; no subcutaneous nodules or induration palpable  NEUROLOGIC: CN II-XII intact; sensation intact in LEs b/l to light touch; moving all extremities spontaneously   PSYCHIATRIC: A+O x 3; mood and affect appropriate; appropriate insight and judgment

## 2024-03-06 NOTE — H&P ADULT - PROBLEM SELECTOR PLAN 4
HFrEF  (EF = _67% _ on Date _2/5/2024____)  Heart failure, CHF order set initiated    Daily weight reviewed today (decreased/stable/increased).  Guideline directed medical therapy as below: (Name/Dose/Frequency)  - BB:  Metoprolol succinate 25mgqd  - ARNI/ACE-I/ARB: Entresto 24-26mg BID

## 2024-03-06 NOTE — H&P ADULT - PROBLEM SELECTOR PLAN 2
Continue patient's home Metoprolol succinate 25mgqd  HOLD Eliquis tonight prior to possible procedure by Urology  Monitor HR.

## 2024-03-06 NOTE — H&P ADULT - NSHPREVIEWOFSYSTEMS_GEN_ALL_CORE
Review of Systems:   CONSTITUTIONAL: No fever, weight loss  EYES: No eye pain, visual disturbances, or discharge  ENMT:  No difficulty hearing, tinnitus, vertigo; No sinus or throat pain  RESPIRATORY: No SOB. No cough, wheezing, chills or hemoptysis  CARDIOVASCULAR: No chest pain, palpitations, dizziness, or leg swelling  GASTROINTESTINAL: LLQ pain.   GENITOURINARY: No dysuria, frequency, hematuria, or incontinence  NEUROLOGICAL: No headaches, memory loss, loss of strength, numbness, or tremors  SKIN: No itching, burning, rashes, or lesions   LYMPH NODES: No enlarged glands  ENDOCRINE: No heat or cold intolerance; No hair loss  MUSCULOSKELETAL: No joint pain or swelling; No muscle, back pain  PSYCHIATRIC: No depression, anxiety, mood swings, or difficulty sleeping  HEME/LYMPH: No easy bruising, or bleeding gums

## 2024-03-06 NOTE — ED ADULT NURSE NOTE - NSFALLHARMRISKINTERV_ED_ALL_ED

## 2024-03-06 NOTE — ED ADULT NURSE REASSESSMENT NOTE - SYMPTOMS
Continue with over-the-counter antihistamines such as Benadryl.  Prednisone for 5 days.  Go into the emergency room for worsening swelling, shortness of breath, or trouble breathing.    none

## 2024-03-06 NOTE — ED PROVIDER NOTE - PHYSICAL EXAMINATION
Gen: NAD, non-toxic appearing  Head: normal appearing  HEENT: normal conjunctiva, oral mucosa moist  Lung: no respiratory distress, speaking in full sentences, CTA b/l     CV: regular rate and rhythm, no murmurs  Abd: soft, non distended, tender LLQ abdomen, no rebounding or guarding, positive L cva tenderness    MSK: no visible deformities  Neuro: No focal deficits, AAOx3  Skin: Warm  Psych: normal affect

## 2024-03-06 NOTE — CONSULT NOTE ADULT - SUBJECTIVE AND OBJECTIVE BOX
HPI:  Patient is a 89y Female PMH of breast cancer s/p mastectomy, uterine cancer s/p hysterectomy about 20 years ago, chronic constipation, presents to the ED with worsening LLQ pain x a few weeks. Pt was seen yesterday in the ED for similar symptoms, had a CT scan, which showed 2mm L midureteral stone with mild to moderate hydro. She returns today, again complaining of LLQ radiating to the L flank with some associated nausea. She denies vomiting, fevers, chills, dysuria, hematuria.   In the ED, she is AVSS. Labs showed WBC 6, Cr 0.97, UA without obvious signs of infection.      PAST MEDICAL & SURGICAL HISTORY:  Hypothyroidism      Hypercholesteremia      CA - Breast Cancer      History of Mastectomy      History of Mastectomy      History of Thyroidectomy        FAMILY HISTORY:    SOCIAL HISTORY:   Tobacco hx:  MEDICATIONS  (STANDING):  tamsulosin 0.4 milliGRAM(s) Oral at bedtime    MEDICATIONS  (PRN):    Allergies    No Known Allergies    Intolerances        REVIEW OF SYSTEMS: Pertinent positives and negatives as stated in HPI, otherwise negative    Vital signs  T(C): 36.7 (03-06-24 @ 01:14), Max: 36.8 (03-05-24 @ 23:28)  HR: 60 (03-06-24 @ 01:14)  BP: 119/74 (03-06-24 @ 01:14)  SpO2: 92% (03-06-24 @ 01:14)  Wt(kg): --    Output      Physical Exam  Gen: NAD  Pulm: No respiratory distress, no subcostal retractions  Abd: Soft, NT, ND  Back: (+) mild L CVAT    LABS:        03-06 @ 01:13    WBC 6.06  / Hct 34.9  / SCr 0.97     03-05 @ 03:05    WBC 7.11  / Hct 36.1  / SCr 0.80     03-06    138  |  104  |  20  ----------------------------<  123<H>  4.5   |  21<L>  |  0.97    Ca    9.9      06 Mar 2024 01:13    TPro  7.0  /  Alb  3.8  /  TBili  1.8<H>  /  DBili  x   /  AST  20  /  ALT  18  /  AlkPhos  51  03-06      Urinalysis Basic - ( 06 Mar 2024 01:13 )    Color: x / Appearance: x / SG: x / pH: x  Gluc: 123 mg/dL / Ketone: x  / Bili: x / Urobili: x   Blood: x / Protein: x / Nitrite: x   Leuk Esterase: x / RBC: x / WBC x   Sq Epi: x / Non Sq Epi: x / Bacteria: x        Urine Cx: pending    Radiology:      ACC: [id number] EXAM:  CT ABDOMEN AND PELVIS IC   ORDERED BY:  ROLANDO PETE     PROCEDURE DATE:  03/05/2024          INTERPRETATION:  CLINICAL INFORMATION: Left lower quadrant abdominal pain.    COMPARISON: 01/03/2022.    CONTRAST/COMPLICATIONS:  IV Contrast: Omnipaque 350  90 cc administered   10 cc discarded  Oral Contrast: NONE  Complications: None reported at time of study completion    PROCEDURE:  CT of the Abdomen and Pelvis was performed.  Sagittal and coronal reformats were performed.    FINDINGS:  LOWER CHEST: Cardiomegaly.  Right breast implant.    LIVER: An approximately 2 cm cyst at the hepatic dome (301:50), stable   from 01/03/2022.  An irregularly shaped 1.8 cm hypoattenuating focus in   the right hepatic lobe (301:33) is grosslystable in size from   01/03/2022.  A few additional subcentimeter hypoattenuating foci in the   liver are too small to accurately characterize by CT technique.  BILE DUCTS: Normal caliber.  GALLBLADDER: Cholecystectomy.  SPLEEN: Within normal limits.  PANCREAS: Minimally prominent main pancreatic duct measures up to 3 mm in   the pancreatic head..  ADRENALS: Within normal limits.  KIDNEYS/URETERS: Mild left hydronephrosis and proximal hydroureter,   caused by a punctate 2 mm calculus located in the mid left ureter,   approximately at the level the pelvic brim.  The left renal nephrogram is   mildly delayed, indicating obstructive uropathy    BLADDER: Partially obscured by streak artifact from right hip prosthesis.    No abnormalities detected.  REPRODUCTIVE ORGANS: Hysterectomy.    BOWEL: No bowel obstruction.  The appendix appears within normal limits.  PERITONEUM: No ascites, free air or abscess.  VESSELS: Atherosclerotic calcifications of the aorta and iliac tree and   proximal thigh vasculature.  RETROPERITONEUM/LYMPH NODES: No lymphadenopathy.  ABDOMINAL WALL: Postsurgical changes in the lower ventral abdominal wall.    No abnormal fluid collection.  BONES: Lumbar scoliosis, convex to left with the apex at L2-L3.  Chronic,   mildcentral endplate fracture in the superior endplate of L2.    Multilevel degenerative changes in the spine.  Moderate to severe spinal   canal stenosis at L3-L4.  Mild to moderate spinal canal stenosis at L2-L3   and L4-L5.  The patient is status postright total hip arthroplasty.    Severe left hip osteoarthrosis.    IMPRESSION:  Mild left hydronephrosis and obstructive uropathy caused by a punctate 2   mm calculus located in the mid left ureter, approximately at the level of   the pelvic brim.    An approximately 1.8 cm hypoattenuating focus in the right hepatic lobe   is stable from 01/03/2022.  Most likely this represents a hemangioma.    Lumbar scoliosis and multilevel degenerative changes in the spine.    Moderate to severe spinal canal stenosis at L3-L4.        --- End of Report ---            YANIQUE CONKLIN MD; Attending Radiologist  This document has been electronically signed. Mar  5 2024  5:28AM    
Patient is a 89y old  Female who presents with a chief complaint of Kidney Stone (06 Mar 2024 18:37)      HPI:  89 year old female with history of Breast cancer(s/p mastectomy), Uterine cancer(hysterectomy 20 years ago), Chronic Afib on Eliquis, Hypothyroidism, CHFrEF, and vertigo presents with worsening LLQ pain attributed to kidney stone. She was in the emergency room yesterday and had improvement with pain medication and was discharged home but she states that later on at home the pain worsened prompting her to come back to the emergency room. She denies any fever or headache. Her symptoms are associated with nausea but no vomiting. She is asking for something stronger than Tylenol as she feels the pain is unbearable at this time.  (06 Mar 2024 18:37)      PAST MEDICAL & SURGICAL HISTORY:  Hypothyroidism      Hypercholesteremia      CA - Breast Cancer      History of Mastectomy      History of Mastectomy      History of Thyroidectomy          PREVIOUS DIAGNOSTIC TESTING:      ECHO  FINDINGS:    STRESS  FINDINGS:    CATHETERIZATION  FINDINGS:    MEDICATIONS  (STANDING):  dextrose 5%. 1000 milliLiter(s) (50 mL/Hr) IV Continuous <Continuous>  dextrose 5%. 1000 milliLiter(s) (100 mL/Hr) IV Continuous <Continuous>  dextrose 50% Injectable 25 Gram(s) IV Push once  dextrose 50% Injectable 12.5 Gram(s) IV Push once  dextrose 50% Injectable 25 Gram(s) IV Push once  glucagon  Injectable 1 milliGRAM(s) IntraMuscular once  levothyroxine 112 MICROGram(s) Oral daily  metoprolol succinate ER 25 milliGRAM(s) Oral daily  pantoprazole    Tablet 40 milliGRAM(s) Oral before breakfast  sacubitril 24 mG/valsartan 26 mG 1 Tablet(s) Oral two times a day  simvastatin 20 milliGRAM(s) Oral at bedtime  tamsulosin 0.4 milliGRAM(s) Oral at bedtime    MEDICATIONS  (PRN):  acetaminophen     Tablet .. 650 milliGRAM(s) Oral every 6 hours PRN Mild Pain (1 - 3)  aluminum hydroxide/magnesium hydroxide/simethicone Suspension 30 milliLiter(s) Oral every 4 hours PRN Dyspepsia  dextrose Oral Gel 15 Gram(s) Oral once PRN Blood Glucose LESS THAN 70 milliGRAM(s)/deciliter  meclizine 12.5 milliGRAM(s) Oral every 6 hours PRN Dizziness  melatonin 3 milliGRAM(s) Oral at bedtime PRN Insomnia  ondansetron Injectable 4 milliGRAM(s) IV Push every 8 hours PRN Nausea and/or Vomiting      FAMILY HISTORY:      SOCIAL HISTORY:    CIGARETTES:    ALCOHOL:    REVIEW OF SYSTEMS:  CONSTITUTIONAL: No fever, weight loss, or fatigue  EYES: No eye pain, visual disturbances, or discharge  ENMT:  No difficulty hearing, tinnitus, vertigo; No sinus or throat pain  NECK: No pain or stiffness  RESPIRATORY: No cough, wheezing, chills or hemoptysis; No shortness of breath  CARDIOVASCULAR: No chest pain, palpitations, dizziness, or leg swelling  GASTROINTESTINAL: No abdominal or epigastric pain. No nausea, vomiting, or hematemesis; No diarrhea or constipation. No melena or hematochezia.  GENITOURINARY: No dysuria, frequency, hematuria, or incontinence  NEUROLOGICAL: No headaches, memory loss, loss of strength, numbness, or tremors  SKIN: No itching, burning, rashes, or lesions   LYMPH NODES: No enlarged glands  ENDOCRINE: No heat or cold intolerance; No hair loss  MUSCULOSKELETAL: No joint pain or swelling; No muscle, back, or extremity pain  PSYCHIATRIC: No depression, anxiety, mood swings, or difficulty sleeping  HEME/LYMPH: No easy bruising, or bleeding gums  ALLERY AND IMMUNOLOGIC: No hives or eczema    Vital Signs Last 24 Hrs  T(C): 36.6 (06 Mar 2024 18:34), Max: 36.8 (05 Mar 2024 23:28)  T(F): 97.8 (06 Mar 2024 18:34), Max: 98.2 (05 Mar 2024 23:28)  HR: 69 (06 Mar 2024 18:34) (59 - 69)  BP: 119/75 (06 Mar 2024 18:34) (107/50 - 143/87)  BP(mean): 98 (06 Mar 2024 14:36) (68 - 101)  RR: 18 (06 Mar 2024 18:34) (13 - 18)  SpO2: 95% (06 Mar 2024 18:34) (91% - 99%)    Parameters below as of 06 Mar 2024 18:34  Patient On (Oxygen Delivery Method): room air          PHYSICAL EXAM:  GENERAL: NAD, well-groomed, well-developed  HEAD:  Atraumatic, Normocephalic  EYES: EOMI, PERRLA, conjunctiva and sclera clear  ENMT: No tonsillar erythema, exudates, or enlargement; Moist mucous membranes, Good dentition, No lesions  NECK: Supple, No JVD, Normal thyroid  NERVOUS SYSTEM:  Alert & Oriented X3, Good concentration; Motor Strength 5/5 B/L upper and lower extremities; DTRs 2+ intact and symmetric  CHEST/LUNG: Clear to percussion bilaterally; No rales, rhonchi, wheezing, or rubs  HEART: Regular rate and rhythm; No murmurs, rubs, or gallops  ABDOMEN: Soft, Nontender, Nondistended; Bowel sounds present  EXTREMITIES:  2+ Peripheral Pulses, No clubbing, cyanosis, or edema  LYMPH: No lymphadenopathy noted  SKIN: No rashes or lesions      INTERPRETATION OF TELEMETRY:    ECG:    CHADSVASC:     LABS:                        11.7   6.06  )-----------( 188      ( 06 Mar 2024 01:13 )             34.9     03-06    138  |  104  |  20  ----------------------------<  123<H>  4.5   |  21<L>  |  0.97    Ca    9.9      06 Mar 2024 01:13    TPro  7.0  /  Alb  3.8  /  TBili  1.8<H>  /  DBili  x   /  AST  20  /  ALT  18  /  AlkPhos  51  03-06          Urinalysis Basic - ( 06 Mar 2024 08:42 )    Color: Dark Yellow / Appearance: Clear / SG: >1.030 / pH: x  Gluc: x / Ketone: Trace mg/dL  / Bili: Negative / Urobili: 0.2 mg/dL   Blood: x / Protein: 30 mg/dL / Nitrite: Negative   Leuk Esterase: Negative / RBC: 57 /HPF / WBC 28 /HPF   Sq Epi: x / Non Sq Epi: 1 /HPF / Bacteria: Negative /HPF      Lipid Panel:   I&O's Summary      RADIOLOGY & ADDITIONAL STUDIES:

## 2024-03-06 NOTE — H&P ADULT - ASSESSMENT
normal rate and rhythm, no chest pain and no edema.
89 year old female with history of Breast cancer(s/p mastectomy), Uterine cancer(hysterectomy 20 years ago), Chronic Afib on Eliquis, Hypothyroidism, CHFrEF, and vertigo who presents with kidney stone seen on CT, urology consulted for potential intervention.

## 2024-03-06 NOTE — ED ADULT NURSE NOTE - OBJECTIVE STATEMENT
89YO female with pmhx of breast ca mastectomy, uterine ca, chronic constipation s/p hysterectomy, afib -Eliquis comes via ems from home with c/o flank pain. Pt was seen in the ED yesterday was dc'd with dx of kidney stones. Pt c/o LLQ and nausea. Pt is A&Ox2/3 with confusion, respirations are even and nonlabored, radial pulses are strong and equal. pt denies fevers, diarrhea or vomiting. son at bedside. Pt placed in position of comfort. Pt educated on call bell system and provided call bell. Bed in lowest position, wheels locked, appropriate side rails raised. Pt denies needs at this time.

## 2024-03-06 NOTE — H&P ADULT - HISTORY OF PRESENT ILLNESS
89 year old female with history of Breast cancer(s/p mastectomy), Uterine cancer(hysterectomy 20 years ago), Chronic Afib on Eliquis, Hypothyroidism, CHFrEF, and vertigo presents with worsening LLQ pain attributed to kidney stone. She was in the emergency room yesterday and had improvement with pain medication and was discharged home but she states that later on at home the pain worsened prompting her to come back to the emergency room. She denies any fever or headache. Her symptoms are associated with nausea but no vomiting. She is asking for something stronger than Tylenol as she feels the pain is unbearable at this time.

## 2024-03-06 NOTE — ED ADULT NURSE REASSESSMENT NOTE - NS ED NURSE REASSESS COMMENT FT1
Patient placed in a gown. Pt placed in position of comfort. provided blankets, Pt educated on call bell system and provided call bell. Bed in lowest position, wheels locked, appropriate side rails raised. Pt denies needs at this time.
Pt is being wheeled chair to the bathroom by ancillary, pt and family member aware of need for urine sample.
pt reports pain is returning, contacted admitting provider Jordyn to request pain management order. awaiting instructions
Received report from Izabela Gamez. pt A&Ox2 able to follow all commands. Pulse, motor, sensation present and equal in all 4 extremities. pt Breathing spontaneous and unlabored on room air, Skin warm and dry and of color appropriate for ethnicity , moves all extremities, speech clear. pt taken to bathroom with wheelchair urine sample collected and sent, pending admission bed. no further nurse intervention needed at this time.

## 2024-03-06 NOTE — H&P ADULT - NSHPADDITIONALINFOADULT_GEN_ALL_CORE
Discussed plan of care with patient's granddaughter at bedside, agrees to hospitalization and medical plan.

## 2024-03-06 NOTE — ED PROVIDER NOTE - CLINICAL SUMMARY MEDICAL DECISION MAKING FREE TEXT BOX
Afebrile hemodynamically stable female with known 2 mm calculus located at mid left ureter at the level of the pelvic brim found on CT (3/5/2024) with persistent left lower quadrant pain and nausea despite use of ibuprofen/acetaminophen.  Ordered basic labs, UA with culture to assess for kidney function.  Given fluids, pain medication, hold off on tramadol for increased bleeding risk as patient is on Eliquis.  Repeat Imaging not warranted at this time given recently obtained less than 24 hrs. Likely dispo home with close nephrology and PCP follow-up.

## 2024-03-06 NOTE — H&P ADULT - PROBLEM SELECTOR PLAN 1
CT shows Mild left hydronephrosis and obstructive uropathy caused by a punctate 2   mm calculus located in the mid left ureter, approximately at the level of   the pelvic brim.  Urology consulted-->> will try medical expulsive therapy, monitor patient's pain and consider for OR tomorrow  Preop labs ordered  Tylenol for mild pain, Toradol for moderate-severe  pain.  IV Zofran for nausea   Followup further Urology recs.   Continue Flomax  Followup urine culture.

## 2024-03-06 NOTE — ED PROVIDER NOTE - OBJECTIVE STATEMENT
89-year-old past medical history breast cancer (mastectomy), uterine cancer (hysterectomy about 20 years ago), chronic constipation from hysterectomy (follows with GI specialist), hyperlipidemia fib on Eliquis recently seen in ED yesterday 3/5/2024 at [facility] for nephrolithiasis presenting today with similar symptoms of left lower quadrant pain.  Endorses associated symptoms of nausea.  Notes symptoms have gotten progressively worse since the discharge despite use of ibuprofen/acetaminophen.  Denies fever, chills, chest pain, SOB, lightheadedness, dizziness, dysuria, hematuria, urinary urgency.

## 2024-03-06 NOTE — CONSULT NOTE ADULT - ASSESSMENT
89 year old female with history of Breast cancer(s/p mastectomy), Uterine cancer(hysterectomy 20 years ago), Chronic Afib on Eliquis, Hypothyroidism, CHFrEF, and vertigo who presents with kidney stone seen on CT, urology consulted for potential intervention.        Problem/Plan - 1:  ·  Problem: Nephrolithiasis.   ·  Plan: CT shows Mild left hydronephrosis and obstructive uropathy caused by a punctate 2   mm calculus located in the mid left ureter, approximately at the level of   the pelvic brim.  Urology consulted-->> will try medical expulsive therapy, monitor patient's pain and consider for OR tomorrow  Preop labs ordered  Tylenol for mild pain, Toradol for moderate-severe  pain.  IV Zofran for nausea   Followup further Urology recs.   Continue Flomax  Followup urine culture.     Problem/Plan - 2:  ·  Problem: Chronic atrial fibrillation.   ·  Plan: Continue patient's home Metoprolol succinate 25mgqd  HOLD Eliquis tonight prior to possible procedure by Urology  Monitor HR.     Problem/Plan - 3:  ·  Problem: Hypothyroidism.   ·  Plan: Continue patient's home synthroid  TSH in AM.     Problem/Plan - 4:  ·  Problem: Heart failure.   ·  Plan: HFrEF  (EF = _67% _ on Date _2/5/2024____)  Heart failure, CHF order set initiated    Daily weight reviewed today (decreased/stable/increased).  Guideline directed medical therapy as below: (Name/Dose/Frequency)  - BB:  Metoprolol succinate 25mgqd  - ARNI/ACE-I/ARB: Entresto 24-26mg BID.     Problem/Plan - 5:  ·  Problem: Hypertension.   ·  Plan: Continue home meds.     Problem/Plan - 6:  ·  Problem: Hyperlipidemia.   ·  Plan: Lipid panel in AM.     Problem/Plan - 7:  ·  Problem: Vertigo.   ·  Plan: Continue patient's meclizine.   Patient has set up vestibular therapy 3x a week outpatient.

## 2024-03-06 NOTE — CONSULT NOTE ADULT - TIME BILLING
- Review of records, telemetry, vital signs and daily labs.   - General and cardiovascular physical examination.  - Generation of cardiovascular treatment plan.  - Coordination of care.      Patient was seen and examined by me on 3/6/24interim events noted,labs and radiology studies reviewed.  Kvng Ni MD,FACC.  [address] [place], [place] [id number].  [phone]

## 2024-03-06 NOTE — CONSULT NOTE ADULT - ASSESSMENT
89y Female PMH of breast cancer s/p mastectomy, uterine cancer s/p hysterectomy about 20 years ago, chronic constipation, presents to the ED with worsening LLQ pain x a few weeks, found to have a 2mm L mid ureteral stone with mild to moderate hydro.    Recs:  -Trial of medical expulsive therapy  -Flomax  -Pain control  -Zofran PRN nausea  -bowel regimen  -hydration  -Strain urine for calculi  -f/u urine culture  -trend AM labs  -please keep NPO for now in case procedure is indicated     89y Female PMH of breast cancer s/p mastectomy, uterine cancer s/p hysterectomy about 20 years ago, chronic constipation, presents to the ED with worsening LLQ pain x a few weeks, found to have a 2mm L mid ureteral stone with mild to moderate hydro.    Recs:  -Trial of medical expulsive therapy for now  - Can give regular diet today, however will tentatively plan for intervention tomorrow if patient continues to have issues with pain control  - Pending urine culture results, will decide on Left stent vs left ureteroscopy stone removal.   - Urology to discuss plan with patient and family.   - Continue Flomax  - Pain control  - Zofran PRN nausea  - bowel regimen  - hydration  - Strain urine for calculi  - f/u urine culture  - trend AM labs  - Please preop for tomorrow in the event that decision is to go to operating room. NPO at [facility], IVF at [facility], Coags, CBC, BMP, urine culture.  - Discussed with Dr. Dell Savage [place] [facility]  [address] [place], [place] [id number] [phone]

## 2024-03-06 NOTE — ED PROVIDER NOTE - ATTENDING CONTRIBUTION TO CARE
RGUJRAL 89yo hx listed presents with L flank pain. Pt was seen in ED yesterday and diagnosed with 2mm ureter stone with mild hydro. Pt complains of mild nausea no vomiting, diarrhea. No f/c.   On exam, Patient is awake,alert,oriented x 3. Patient is well appearing and in no acute distress. Patient's chest is clear to ausculation, +s1s2. Abdomen is soft nd/nt +BS. Extremity with no swelling or calf tenderness. No cvat.   Patient states she can only take tylenol for pain and does not feel better. Denies any dysuria.   Check labs, pain control, pain control and monitor.

## 2024-03-07 LAB
-  AMOXICILLIN/CLAVULANIC ACID: SIGNIFICANT CHANGE UP
-  AMPICILLIN/SULBACTAM: SIGNIFICANT CHANGE UP
-  AMPICILLIN: SIGNIFICANT CHANGE UP
-  AZTREONAM: SIGNIFICANT CHANGE UP
-  CEFAZOLIN: SIGNIFICANT CHANGE UP
-  CEFEPIME: SIGNIFICANT CHANGE UP
-  CEFOXITIN: SIGNIFICANT CHANGE UP
-  CEFTRIAXONE: SIGNIFICANT CHANGE UP
-  CEFUROXIME: SIGNIFICANT CHANGE UP
-  CIPROFLOXACIN: SIGNIFICANT CHANGE UP
-  ERTAPENEM: SIGNIFICANT CHANGE UP
-  GENTAMICIN: SIGNIFICANT CHANGE UP
-  IMIPENEM: SIGNIFICANT CHANGE UP
-  LEVOFLOXACIN: SIGNIFICANT CHANGE UP
-  MEROPENEM: SIGNIFICANT CHANGE UP
-  NITROFURANTOIN: SIGNIFICANT CHANGE UP
-  PIPERACILLIN/TAZOBACTAM: SIGNIFICANT CHANGE UP
-  TOBRAMYCIN: SIGNIFICANT CHANGE UP
-  TRIMETHOPRIM/SULFAMETHOXAZOLE: SIGNIFICANT CHANGE UP
A1C WITH ESTIMATED AVERAGE GLUCOSE RESULT: 5.8 % — HIGH (ref 4–5.6)
ALBUMIN SERPL ELPH-MCNC: 3.3 G/DL — SIGNIFICANT CHANGE UP (ref 3.3–5)
ALP SERPL-CCNC: 44 U/L — SIGNIFICANT CHANGE UP (ref 40–120)
ALT FLD-CCNC: 12 U/L — SIGNIFICANT CHANGE UP (ref 10–45)
ANION GAP SERPL CALC-SCNC: 12 MMOL/L — SIGNIFICANT CHANGE UP (ref 5–17)
APTT BLD: 31.9 SEC — SIGNIFICANT CHANGE UP (ref 24.5–35.6)
AST SERPL-CCNC: 18 U/L — SIGNIFICANT CHANGE UP (ref 10–40)
BILIRUB SERPL-MCNC: 1.7 MG/DL — HIGH (ref 0.2–1.2)
BUN SERPL-MCNC: 20 MG/DL — SIGNIFICANT CHANGE UP (ref 7–23)
CALCIUM SERPL-MCNC: 8.9 MG/DL — SIGNIFICANT CHANGE UP (ref 8.4–10.5)
CHLORIDE SERPL-SCNC: 105 MMOL/L — SIGNIFICANT CHANGE UP (ref 96–108)
CO2 SERPL-SCNC: 20 MMOL/L — LOW (ref 22–31)
CREAT SERPL-MCNC: 1.05 MG/DL — SIGNIFICANT CHANGE UP (ref 0.5–1.3)
CULTURE RESULTS: ABNORMAL
CULTURE RESULTS: SIGNIFICANT CHANGE UP
EGFR: 50 ML/MIN/1.73M2 — LOW
ESTIMATED AVERAGE GLUCOSE: 120 MG/DL — HIGH (ref 68–114)
GLUCOSE BLDC GLUCOMTR-MCNC: 111 MG/DL — HIGH (ref 70–99)
GLUCOSE BLDC GLUCOMTR-MCNC: 127 MG/DL — HIGH (ref 70–99)
GLUCOSE SERPL-MCNC: 105 MG/DL — HIGH (ref 70–99)
HCT VFR BLD CALC: 30.9 % — LOW (ref 34.5–45)
HGB BLD-MCNC: 10 G/DL — LOW (ref 11.5–15.5)
INR BLD: 1.79 RATIO — HIGH (ref 0.85–1.18)
MCHC RBC-ENTMCNC: 32.4 GM/DL — SIGNIFICANT CHANGE UP (ref 32–36)
MCHC RBC-ENTMCNC: 32.4 PG — SIGNIFICANT CHANGE UP (ref 27–34)
MCV RBC AUTO: 100 FL — SIGNIFICANT CHANGE UP (ref 80–100)
METHOD TYPE: SIGNIFICANT CHANGE UP
NRBC # BLD: 0 /100 WBCS — SIGNIFICANT CHANGE UP (ref 0–0)
ORGANISM # SPEC MICROSCOPIC CNT: ABNORMAL
ORGANISM # SPEC MICROSCOPIC CNT: ABNORMAL
PLATELET # BLD AUTO: 149 K/UL — LOW (ref 150–400)
POTASSIUM SERPL-MCNC: 4.2 MMOL/L — SIGNIFICANT CHANGE UP (ref 3.5–5.3)
POTASSIUM SERPL-SCNC: 4.2 MMOL/L — SIGNIFICANT CHANGE UP (ref 3.5–5.3)
PROT SERPL-MCNC: 5.7 G/DL — LOW (ref 6–8.3)
PROTHROM AB SERPL-ACNC: 19.4 SEC — HIGH (ref 9.5–13)
RBC # BLD: 3.09 M/UL — LOW (ref 3.8–5.2)
RBC # FLD: 14.5 % — SIGNIFICANT CHANGE UP (ref 10.3–14.5)
SODIUM SERPL-SCNC: 137 MMOL/L — SIGNIFICANT CHANGE UP (ref 135–145)
SPECIMEN SOURCE: SIGNIFICANT CHANGE UP
SPECIMEN SOURCE: SIGNIFICANT CHANGE UP
TSH SERPL-MCNC: 0.33 UIU/ML — SIGNIFICANT CHANGE UP (ref 0.27–4.2)
WBC # BLD: 7.16 K/UL — SIGNIFICANT CHANGE UP (ref 3.8–10.5)
WBC # FLD AUTO: 7.16 K/UL — SIGNIFICANT CHANGE UP (ref 3.8–10.5)

## 2024-03-07 PROCEDURE — 99232 SBSQ HOSP IP/OBS MODERATE 35: CPT | Mod: GC

## 2024-03-07 RX ORDER — KETOROLAC TROMETHAMINE 30 MG/ML
15 SYRINGE (ML) INJECTION ONCE
Refills: 0 | Status: DISCONTINUED | OUTPATIENT
Start: 2024-03-07 | End: 2024-03-07

## 2024-03-07 RX ORDER — CHLORHEXIDINE GLUCONATE 213 G/1000ML
1 SOLUTION TOPICAL DAILY
Refills: 0 | Status: DISCONTINUED | OUTPATIENT
Start: 2024-03-07 | End: 2024-03-08

## 2024-03-07 RX ADMIN — ONDANSETRON 4 MILLIGRAM(S): 8 TABLET, FILM COATED ORAL at 21:38

## 2024-03-07 RX ADMIN — SACUBITRIL AND VALSARTAN 1 TABLET(S): 24; 26 TABLET, FILM COATED ORAL at 17:48

## 2024-03-07 RX ADMIN — Medication 25 MILLIGRAM(S): at 12:15

## 2024-03-07 RX ADMIN — Medication 15 MILLIGRAM(S): at 22:10

## 2024-03-07 RX ADMIN — Medication 650 MILLIGRAM(S): at 17:48

## 2024-03-07 RX ADMIN — Medication 112 MICROGRAM(S): at 12:16

## 2024-03-07 RX ADMIN — TAMSULOSIN HYDROCHLORIDE 0.4 MILLIGRAM(S): 0.4 CAPSULE ORAL at 22:38

## 2024-03-07 RX ADMIN — Medication 650 MILLIGRAM(S): at 00:01

## 2024-03-07 RX ADMIN — Medication 650 MILLIGRAM(S): at 01:40

## 2024-03-07 RX ADMIN — ONDANSETRON 4 MILLIGRAM(S): 8 TABLET, FILM COATED ORAL at 00:02

## 2024-03-07 RX ADMIN — PANTOPRAZOLE SODIUM 40 MILLIGRAM(S): 20 TABLET, DELAYED RELEASE ORAL at 12:16

## 2024-03-07 RX ADMIN — Medication 15 MILLIGRAM(S): at 21:38

## 2024-03-07 RX ADMIN — CHLORHEXIDINE GLUCONATE 1 APPLICATION(S): 213 SOLUTION TOPICAL at 13:12

## 2024-03-07 RX ADMIN — SIMVASTATIN 20 MILLIGRAM(S): 20 TABLET, FILM COATED ORAL at 22:38

## 2024-03-07 RX ADMIN — SODIUM CHLORIDE 50 MILLILITER(S): 9 INJECTION INTRAMUSCULAR; INTRAVENOUS; SUBCUTANEOUS at 00:23

## 2024-03-07 RX ADMIN — SACUBITRIL AND VALSARTAN 1 TABLET(S): 24; 26 TABLET, FILM COATED ORAL at 12:15

## 2024-03-07 RX ADMIN — MORPHINE SULFATE 4 MILLIGRAM(S): 50 CAPSULE, EXTENDED RELEASE ORAL at 01:26

## 2024-03-07 NOTE — PROGRESS NOTE ADULT - ATTENDING COMMENTS
Pain well controlled, afebrile, stable  Small 2 mm L distal ureteral stone, normal Cr  Ucx 10-49k unclear if true infection  Treat with abx and MET  Return to ER precautions reviewed

## 2024-03-07 NOTE — PATIENT PROFILE ADULT - FALL HARM RISK - RISK INTERVENTIONS
Assistance OOB with selected safe patient handling equipment/Assistance with ambulation/Communicate Fall Risk and Risk Factors to all staff, patient, and family/Discuss with provider need for PT consult/Monitor gait and stability/Provide patient with walking aids - walker, cane, crutches/Reinforce activity limits and safety measures with patient and family/Sit up slowly, dangle for a short time, stand at bedside before walking/Visual Cue: Yellow wristband/Bed in lowest position, wheels locked, appropriate side rails in place/Call bell, personal items and telephone in reach/Instruct patient to call for assistance before getting out of bed or chair/Non-slip footwear when patient is out of bed/[place] to call system/Physically safe environment - no spills, clutter or unnecessary equipment/Purposeful Proactive Rounding/Room/bathroom lighting operational, light cord in reach

## 2024-03-07 NOTE — PROGRESS NOTE ADULT - PROBLEM SELECTOR PLAN 7
Continue patient's meclizine.   Patient has set up vestibular therapy 3x a week outpatient.
Continue patient's meclizine.   Patient has set up vestibular therapy 3x a week outpatient.

## 2024-03-07 NOTE — PROGRESS NOTE ADULT - PROBLEM SELECTOR PLAN 8
DVT PPx: Hold off Eliquis due to possible procedure tomorrow    NPO after midnight.
DVT PPx: Hold off Eliquis due to possible procedure tomorrow    NPO after midnight.

## 2024-03-07 NOTE — PATIENT PROFILE ADULT - FUNCTIONAL ASSESSMENT - BASIC MOBILITY 6.
2-calculated by average/Not able to assess (calculate score using [facility] averaging method)

## 2024-03-07 NOTE — PROGRESS NOTE ADULT - PROBLEM SELECTOR PLAN 1
- cont flomax  - f/u urine culture- positive, ecoli, but low CFU 10-49K.   - Discussed with Dr. Sharpe  - F/u outpatient with Dr Sharpe  for definitive stone management.   d/c planning
- cont flomax  - f/u urine culture- positive, ecoli, but low CFU 10-49K.   - Discussed with Dr. Sharpe  - F/u outpatient with Dr Sharpe  for definitive stone management.   d/c planning

## 2024-03-07 NOTE — PROGRESS NOTE ADULT - SUBJECTIVE AND OBJECTIVE BOX
Urology Progress Note     Subjective/24hour Events:   Patient seen and examined.   No acute events overnight.   Pain controlled.     Vital Signs:  Vital Signs Last 24 Hrs  T(C): 36.8 (07 Mar 2024 09:32), Max: 36.9 (06 Mar 2024 20:40)  T(F): 98.3 (07 Mar 2024 09:32), Max: 98.5 (06 Mar 2024 20:40)  HR: 74 (07 Mar 2024 09:32) (66 - 74)  BP: 134/64 (07 Mar 2024 09:32) (119/75 - 167/60)  BP(mean): 92 (06 Mar 2024 20:40) (92 - 101)  RR: 18 (07 Mar 2024 09:32) (15 - 18)  SpO2: 96% (07 Mar 2024 09:32) (95% - 99%)    Parameters below as of 07 Mar 2024 09:32  Patient On (Oxygen Delivery Method): room air        CAPILLARY BLOOD GLUCOSE      POCT Blood Glucose.: 111 mg/dL (07 Mar 2024 07:48)      I&O's Detail    07 Mar 2024 07:01  -  07 Mar 2024 11:45  --------------------------------------------------------  IN:  Total IN: 0 mL    OUT:    Oral Fluid: 0 mL  Total OUT: 0 mL    Total NET: 0 mL          MEDICATIONS  (STANDING):  chlorhexidine 2% Cloths 1 Application(s) Topical daily  dextrose 5%. 1000 milliLiter(s) (100 mL/Hr) IV Continuous <Continuous>  dextrose 5%. 1000 milliLiter(s) (50 mL/Hr) IV Continuous <Continuous>  dextrose 50% Injectable 25 Gram(s) IV Push once  dextrose 50% Injectable 12.5 Gram(s) IV Push once  dextrose 50% Injectable 25 Gram(s) IV Push once  glucagon  Injectable 1 milliGRAM(s) IntraMuscular once  levothyroxine 112 MICROGram(s) Oral daily  metoprolol succinate ER 25 milliGRAM(s) Oral daily  pantoprazole    Tablet 40 milliGRAM(s) Oral before breakfast  sacubitril 24 mG/valsartan 26 mG 1 Tablet(s) Oral two times a day  simvastatin 20 milliGRAM(s) Oral at bedtime  sodium chloride 0.9%. 1000 milliLiter(s) (50 mL/Hr) IV Continuous <Continuous>  tamsulosin 0.4 milliGRAM(s) Oral at bedtime    MEDICATIONS  (PRN):  acetaminophen     Tablet .. 650 milliGRAM(s) Oral every 6 hours PRN Mild Pain (1 - 3)  aluminum hydroxide/magnesium hydroxide/simethicone Suspension 30 milliLiter(s) Oral every 4 hours PRN Dyspepsia  dextrose Oral Gel 15 Gram(s) Oral once PRN Blood Glucose LESS THAN 70 milliGRAM(s)/deciliter  meclizine 12.5 milliGRAM(s) Oral every 6 hours PRN Dizziness  melatonin 3 milliGRAM(s) Oral at bedtime PRN Insomnia  ondansetron Injectable 4 milliGRAM(s) IV Push every 8 hours PRN Nausea and/or Vomiting      Physical Exam:  Gen: NAD.  Lungs: Non labored breathing.   Ab: Soft, nontender, nondistended. No CVAT  : Voiding freely    Labs:    03-07    137  |  105  |  20  ----------------------------<  105<H>  4.2   |  20<L>  |  1.05    Ca    8.9      07 Mar 2024 07:38    TPro  5.7<L>  /  Alb  3.3  /  TBili  1.7<H>  /  DBili  x   /  AST  18  /  ALT  12  /  AlkPhos  44  03-07    LIVER FUNCTIONS - ( 07 Mar 2024 07:38 )  Alb: 3.3 g/dL / Pro: 5.7 g/dL / ALK PHOS: 44 U/L / ALT: 12 U/L / AST: 18 U/L / GGT: x                                 10.0   7.16  )-----------( 149      ( 07 Mar 2024 07:38 )             30.9     PT/INR - ( 07 Mar 2024 07:38 )   PT: 19.4 sec;   INR: 1.79 ratio         PTT - ( 07 Mar 2024 07:38 )  PTT:31.9 sec    
Patient is a 89y old  Female who presents with a chief complaint of Kidney Stone (07 Mar 2024 11:44)      HPI:  89 year old female with history of Breast cancer(s/p mastectomy), Uterine cancer(hysterectomy 20 years ago), Chronic Afib on Eliquis, Hypothyroidism, CHFrEF, and vertigo presents with worsening LLQ pain attributed to kidney stone. She was in the emergency room yesterday and had improvement with pain medication and was discharged home but she states that later on at home the pain worsened prompting her to come back to the emergency room. She denies any fever or headache. Her symptoms are associated with nausea but no vomiting. She is asking for something stronger than Tylenol as she feels the pain is unbearable at this time.  (06 Mar 2024 18:37)      PAST MEDICAL & SURGICAL HISTORY:  Hypothyroidism      Hypercholesteremia      CA - Breast Cancer      History of Mastectomy      History of Mastectomy      History of Thyroidectomy          PREVIOUS DIAGNOSTIC TESTING:      ECHO  FINDINGS:    STRESS  FINDINGS:    CATHETERIZATION  FINDINGS:    MEDICATIONS  (STANDING):  chlorhexidine 2% Cloths 1 Application(s) Topical daily  dextrose 5%. 1000 milliLiter(s) (100 mL/Hr) IV Continuous <Continuous>  dextrose 5%. 1000 milliLiter(s) (50 mL/Hr) IV Continuous <Continuous>  dextrose 50% Injectable 25 Gram(s) IV Push once  dextrose 50% Injectable 12.5 Gram(s) IV Push once  dextrose 50% Injectable 25 Gram(s) IV Push once  glucagon  Injectable 1 milliGRAM(s) IntraMuscular once  levothyroxine 112 MICROGram(s) Oral daily  metoprolol succinate ER 25 milliGRAM(s) Oral daily  pantoprazole    Tablet 40 milliGRAM(s) Oral before breakfast  sacubitril 24 mG/valsartan 26 mG 1 Tablet(s) Oral two times a day  simvastatin 20 milliGRAM(s) Oral at bedtime  tamsulosin 0.4 milliGRAM(s) Oral at bedtime    MEDICATIONS  (PRN):  acetaminophen     Tablet .. 650 milliGRAM(s) Oral every 6 hours PRN Mild Pain (1 - 3)  aluminum hydroxide/magnesium hydroxide/simethicone Suspension 30 milliLiter(s) Oral every 4 hours PRN Dyspepsia  dextrose Oral Gel 15 Gram(s) Oral once PRN Blood Glucose LESS THAN 70 milliGRAM(s)/deciliter  meclizine 12.5 milliGRAM(s) Oral every 6 hours PRN Dizziness  melatonin 3 milliGRAM(s) Oral at bedtime PRN Insomnia  ondansetron Injectable 4 milliGRAM(s) IV Push every 8 hours PRN Nausea and/or Vomiting      FAMILY HISTORY:      SOCIAL HISTORY:    CIGARETTES:    ALCOHOL:    REVIEW OF SYSTEMS:  CONSTITUTIONAL: No fever, weight loss, or fatigue  EYES: No eye pain, visual disturbances, or discharge  ENMT:  No difficulty hearing, tinnitus, vertigo; No sinus or throat pain  NECK: No pain or stiffness  RESPIRATORY: No cough, wheezing, chills or hemoptysis; No shortness of breath  CARDIOVASCULAR: No chest pain, palpitations, dizziness, or leg swelling  GASTROINTESTINAL: No abdominal or epigastric pain. No nausea, vomiting, or hematemesis; No diarrhea or constipation. No melena or hematochezia.  GENITOURINARY: No dysuria, frequency, hematuria, or incontinence  NEUROLOGICAL: No headaches, memory loss, loss of strength, numbness, or tremors  SKIN: No itching, burning, rashes, or lesions   LYMPH NODES: No enlarged glands  ENDOCRINE: No heat or cold intolerance; No hair loss  MUSCULOSKELETAL: No joint pain or swelling; No muscle, back, or extremity pain  PSYCHIATRIC: No depression, anxiety, mood swings, or difficulty sleeping  HEME/LYMPH: No easy bruising, or bleeding gums  ALLERY AND IMMUNOLOGIC: No hives or eczema    Vital Signs Last 24 Hrs  T(C): 36.7 (07 Mar 2024 16:11), Max: 36.9 (06 Mar 2024 20:40)  T(F): 98.1 (07 Mar 2024 16:11), Max: 98.5 (06 Mar 2024 20:40)  HR: 79 (07 Mar 2024 16:11) (72 - 83)  BP: 117/72 (07 Mar 2024 16:11) (117/72 - 167/60)  BP(mean): 92 (06 Mar 2024 20:40) (92 - 92)  RR: 18 (07 Mar 2024 16:11) (18 - 18)  SpO2: 93% (07 Mar 2024 16:11) (93% - 98%)    Parameters below as of 07 Mar 2024 16:11  Patient On (Oxygen Delivery Method): room air          PHYSICAL EXAM:  GENERAL: NAD, well-groomed, well-developed  HEAD:  Atraumatic, Normocephalic  EYES: EOMI, PERRLA, conjunctiva and sclera clear  ENMT: No tonsillar erythema, exudates, or enlargement; Moist mucous membranes, Good dentition, No lesions  NECK: Supple, No JVD, Normal thyroid  NERVOUS SYSTEM:  Alert & Oriented X3, Good concentration; Motor Strength 5/5 B/L upper and lower extremities; DTRs 2+ intact and symmetric  CHEST/LUNG: Clear to percussion bilaterally; No rales, rhonchi, wheezing, or rubs  HEART: Regular rate and rhythm; No murmurs, rubs, or gallops  ABDOMEN: Soft, Nontender, Nondistended; Bowel sounds present  EXTREMITIES:  2+ Peripheral Pulses, No clubbing, cyanosis, or edema  LYMPH: No lymphadenopathy noted  SKIN: No rashes or lesions      INTERPRETATION OF TELEMETRY:    ECG:    [facility]     LABS:                        10.0   7.16  )-----------( 149      ( 07 Mar 2024 07:38 )             30.9     03-07    137  |  105  |  20  ----------------------------<  105<H>  4.2   |  20<L>  |  1.05    Ca    8.9      07 Mar 2024 07:38    TPro  5.7<L>  /  Alb  3.3  /  TBili  1.7<H>  /  DBili  x   /  AST  18  /  ALT  12  /  AlkPhos  44  03-07        PT/INR - ( 07 Mar 2024 07:38 )   PT: 19.4 sec;   INR: 1.79 ratio         PTT - ( 07 Mar 2024 07:38 )  PTT:31.9 sec  Urinalysis Basic - ( 07 Mar 2024 07:38 )    Color: x / Appearance: x / SG: x / pH: x  Gluc: 105 mg/dL / Ketone: x  / Bili: x / Urobili: x   Blood: x / Protein: x / Nitrite: x   Leuk Esterase: x / RBC: x / WBC x   Sq Epi: x / Non Sq Epi: x / Bacteria: x      Lipid Panel:   I&O's Summary    07 Mar 2024 07:01  -  07 Mar 2024 20:05  --------------------------------------------------------  IN: 320 mL / OUT: 0 mL / NET: 320 mL        RADIOLOGY & ADDITIONAL STUDIES:
    SUBJECTIVE / OVERNIGHT EVENTS:pt seen and examined  03-07-24     MEDICATIONS  (STANDING):  chlorhexidine 2% Cloths 1 Application(s) Topical daily  dextrose 5%. 1000 milliLiter(s) (100 mL/Hr) IV Continuous <Continuous>  dextrose 5%. 1000 milliLiter(s) (50 mL/Hr) IV Continuous <Continuous>  dextrose 50% Injectable 25 Gram(s) IV Push once  dextrose 50% Injectable 12.5 Gram(s) IV Push once  dextrose 50% Injectable 25 Gram(s) IV Push once  glucagon  Injectable 1 milliGRAM(s) IntraMuscular once  levothyroxine 112 MICROGram(s) Oral daily  metoprolol succinate ER 25 milliGRAM(s) Oral daily  pantoprazole    Tablet 40 milliGRAM(s) Oral before breakfast  sacubitril 24 mG/valsartan 26 mG 1 Tablet(s) Oral two times a day  simvastatin 20 milliGRAM(s) Oral at bedtime  tamsulosin 0.4 milliGRAM(s) Oral at bedtime    MEDICATIONS  (PRN):  acetaminophen     Tablet .. 650 milliGRAM(s) Oral every 6 hours PRN Mild Pain (1 - 3)  aluminum hydroxide/magnesium hydroxide/simethicone Suspension 30 milliLiter(s) Oral every 4 hours PRN Dyspepsia  dextrose Oral Gel 15 Gram(s) Oral once PRN Blood Glucose LESS THAN 70 milliGRAM(s)/deciliter  meclizine 12.5 milliGRAM(s) Oral every 6 hours PRN Dizziness  melatonin 3 milliGRAM(s) Oral at bedtime PRN Insomnia  ondansetron Injectable 4 milliGRAM(s) IV Push every 8 hours PRN Nausea and/or Vomiting    T(C): 36.7 (03-07-24 @ 16:11), Max: 36.9 (03-07-24 @ 12:14)  HR: 79 (03-07-24 @ 16:11) (73 - 83)  BP: 117/72 (03-07-24 @ 16:11) (117/72 - 167/60)  RR: 18 (03-07-24 @ 16:11) (18 - 18)  SpO2: 93% (03-07-24 @ 16:11) (93% - 98%)    CAPILLARY BLOOD GLUCOSE      POCT Blood Glucose.: 127 mg/dL (07 Mar 2024 11:50)  POCT Blood Glucose.: 111 mg/dL (07 Mar 2024 07:48)    I&O's Summary    07 Mar 2024 07:01  -  07 Mar 2024 23:17  --------------------------------------------------------  IN: 320 mL / OUT: 0 mL / NET: 320 mL        PHYSICAL EXAM:  GENERAL: NAD  EYES: EOMI, PERRLA  NECK: Supple, No JVD  CHEST/LUNG: dec breath sounds at bases  HEART:  S1 , S2 +  ABDOMEN: soft, bs+  EXTREMITIES:  no edema  NEUROLOGY:alert awake      LABS:                        10.0   7.16  )-----------( 149      ( 07 Mar 2024 07:38 )             30.9     03-07    137  |  105  |  20  ----------------------------<  105<H>  4.2   |  20<L>  |  1.05    Ca    8.9      07 Mar 2024 07:38    TPro  5.7<L>  /  Alb  3.3  /  TBili  1.7<H>  /  DBili  x   /  AST  18  /  ALT  12  /  AlkPhos  44  03-07    PT/INR - ( 07 Mar 2024 07:38 )   PT: 19.4 sec;   INR: 1.79 ratio         PTT - ( 07 Mar 2024 07:38 )  PTT:31.9 sec      Urinalysis Basic - ( 07 Mar 2024 07:38 )    Color: x / Appearance: x / SG: x / pH: x  Gluc: 105 mg/dL / Ketone: x  / Bili: x / Urobili: x   Blood: x / Protein: x / Nitrite: x   Leuk Esterase: x / RBC: x / WBC x   Sq Epi: x / Non Sq Epi: x / Bacteria: x        RADIOLOGY & ADDITIONAL TESTS:    Imaging Personally Reviewed:    Consultant(s) Notes Reviewed:      Care Discussed with Consultants/Other Providers:

## 2024-03-07 NOTE — PROVIDER CONTACT NOTE (OTHER) - BACKGROUND
Admitted for kidney stones, son said she is confused now, but normally not confused. He told me she thinks she is home. I let her know she was NPO for a procedure and she informed me she did not sign

## 2024-03-07 NOTE — PROGRESS NOTE ADULT - PROBLEM SELECTOR PLAN 4
HFrEF  (EF = _67% _ on Date _2/5/2024____)  Heart failure, CHF order set initiated    Daily weight reviewed today (decreased/stable/increased).  Guideline directed medical therapy as below: (Name/Dose/Frequency)  - BB:  Metoprolol succinate 25mgqd  - ARNI/ACE-I/ARB: Entresto 24-26mg BID
HFrEF  (EF = _67% _ on Date _2/5/2024____)  Heart failure, CHF order set initiated    Daily weight reviewed today (decreased/stable/increased).  Guideline directed medical therapy as below: (Name/Dose/Frequency)  - BB:  Metoprolol succinate 25mgqd  - ARNI/ACE-I/ARB: Entresto 24-26mg BID

## 2024-03-07 NOTE — PROGRESS NOTE ADULT - ASSESSMENT
89 year old female with history of Breast cancer(s/p mastectomy), Uterine cancer(hysterectomy 20 years ago), Chronic Afib on Eliquis, Hypothyroidism, CHFrEF, and vertigo who presents with kidney stone seen on CT, urology consulted for potential intervention.

## 2024-03-07 NOTE — PROGRESS NOTE ADULT - TIME BILLING
- Review of records, telemetry, vital signs and daily labs.   - General and cardiovascular physical examination.  - Generation of cardiovascular treatment plan.  - Coordination of care.      Patient was seen and examined by me on 3/7/24,interim events noted,labs and radiology studies reviewed.  Kvng Ni MD,FACC.  [address] [place], [place] [id number].  [phone]

## 2024-03-07 NOTE — PROGRESS NOTE ADULT - PROBLEM SELECTOR PLAN 2
Continue patient's home Metoprolol succinate 25mgqd  HOLD Eliquis tonight prior to possible procedure by Urology  Monitor HR.
Continue patient's home Metoprolol succinate 25mgqd  HOLD Eliquis tonight prior to possible procedure by Urology  Monitor HR.
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
## 2024-03-07 NOTE — PROGRESS NOTE ADULT - ASSESSMENT
89y Female PMH of breast cancer s/p mastectomy, uterine cancer s/p hysterectomy about 20 years ago, chronic constipation, presents to the ED with worsening LLQ pain x a few weeks, found to have a 2mm L mid ureteral stone with mild to moderate hydro.    Recs:  -Trial of medical expulsive therapy for now  - Patient is pain controlled, will cancel surgery for patient given that pain is controlled. Discussed with family.   - Continue Flomax  - Pain control  - Zofran PRN nausea  - bowel regimen  - hydration  - Strain urine for calculi  - f/u urine culture- positive, ecoli, please treat with antibiotics appropriately.  - Discussed with Dr. Sharpe  - F/u outpatient for definitive stone management.     [facility]  [address] [place], [place] [id number] [phone]     89y Female PMH of breast cancer s/p mastectomy, uterine cancer s/p hysterectomy about 20 years ago, chronic constipation, presents to the ED with worsening LLQ pain x a few weeks, found to have a 2mm L mid ureteral stone with mild to moderate hydro.    Recs:  -Trial of medical expulsive therapy for now  - Patient is pain controlled, will cancel surgery for patient given that pain is controlled. Discussed with family.   - Continue Flomax  - Pain control  - Zofran PRN nausea  - bowel regimen  - hydration  - Strain urine for calculi  - f/u urine culture- positive, ecoli, but low CFU 10-49K.  Unclear if true infection however please treat with antibiotics appropriately.  - Discussed with Dr. Sharpe  - F/u outpatient for definitive stone management.     [facility]  [address] [place] [id number] [phone]

## 2024-03-08 ENCOUNTER — TRANSCRIPTION ENCOUNTER (OUTPATIENT)
Age: 89
End: 2024-03-08

## 2024-03-08 VITALS
HEART RATE: 72 BPM | TEMPERATURE: 98 F | OXYGEN SATURATION: 96 % | SYSTOLIC BLOOD PRESSURE: 118 MMHG | RESPIRATION RATE: 18 BRPM | DIASTOLIC BLOOD PRESSURE: 72 MMHG

## 2024-03-08 LAB
ANION GAP SERPL CALC-SCNC: 11 MMOL/L — SIGNIFICANT CHANGE UP (ref 5–17)
BUN SERPL-MCNC: 20 MG/DL — SIGNIFICANT CHANGE UP (ref 7–23)
CALCIUM SERPL-MCNC: 9.4 MG/DL — SIGNIFICANT CHANGE UP (ref 8.4–10.5)
CHLORIDE SERPL-SCNC: 105 MMOL/L — SIGNIFICANT CHANGE UP (ref 96–108)
CO2 SERPL-SCNC: 20 MMOL/L — LOW (ref 22–31)
CREAT SERPL-MCNC: 1.03 MG/DL — SIGNIFICANT CHANGE UP (ref 0.5–1.3)
EGFR: 51 ML/MIN/1.73M2 — LOW
GLUCOSE BLDC GLUCOMTR-MCNC: 112 MG/DL — HIGH (ref 70–99)
GLUCOSE SERPL-MCNC: 88 MG/DL — SIGNIFICANT CHANGE UP (ref 70–99)
HCT VFR BLD CALC: 32.2 % — LOW (ref 34.5–45)
HGB BLD-MCNC: 10.3 G/DL — LOW (ref 11.5–15.5)
MCHC RBC-ENTMCNC: 32 GM/DL — SIGNIFICANT CHANGE UP (ref 32–36)
MCHC RBC-ENTMCNC: 32.5 PG — SIGNIFICANT CHANGE UP (ref 27–34)
MCV RBC AUTO: 101.6 FL — HIGH (ref 80–100)
NRBC # BLD: 0 /100 WBCS — SIGNIFICANT CHANGE UP (ref 0–0)
PLATELET # BLD AUTO: 154 K/UL — SIGNIFICANT CHANGE UP (ref 150–400)
POTASSIUM SERPL-MCNC: 4.1 MMOL/L — SIGNIFICANT CHANGE UP (ref 3.5–5.3)
POTASSIUM SERPL-SCNC: 4.1 MMOL/L — SIGNIFICANT CHANGE UP (ref 3.5–5.3)
RBC # BLD: 3.17 M/UL — LOW (ref 3.8–5.2)
RBC # FLD: 14.6 % — HIGH (ref 10.3–14.5)
SODIUM SERPL-SCNC: 136 MMOL/L — SIGNIFICANT CHANGE UP (ref 135–145)
WBC # BLD: 8.05 K/UL — SIGNIFICANT CHANGE UP (ref 3.8–10.5)
WBC # FLD AUTO: 8.05 K/UL — SIGNIFICANT CHANGE UP (ref 3.8–10.5)

## 2024-03-08 PROCEDURE — 85027 COMPLETE CBC AUTOMATED: CPT

## 2024-03-08 PROCEDURE — 84443 ASSAY THYROID STIM HORMONE: CPT

## 2024-03-08 PROCEDURE — 84484 ASSAY OF TROPONIN QUANT: CPT

## 2024-03-08 PROCEDURE — 80053 COMPREHEN METABOLIC PANEL: CPT

## 2024-03-08 PROCEDURE — 84295 ASSAY OF SERUM SODIUM: CPT

## 2024-03-08 PROCEDURE — 74177 CT ABD & PELVIS W/CONTRAST: CPT | Mod: MC

## 2024-03-08 PROCEDURE — 96374 THER/PROPH/DIAG INJ IV PUSH: CPT | Mod: XU

## 2024-03-08 PROCEDURE — 82330 ASSAY OF CALCIUM: CPT

## 2024-03-08 PROCEDURE — 85018 HEMOGLOBIN: CPT

## 2024-03-08 PROCEDURE — 83605 ASSAY OF LACTIC ACID: CPT

## 2024-03-08 PROCEDURE — 81001 URINALYSIS AUTO W/SCOPE: CPT

## 2024-03-08 PROCEDURE — 87086 URINE CULTURE/COLONY COUNT: CPT

## 2024-03-08 PROCEDURE — 83690 ASSAY OF LIPASE: CPT

## 2024-03-08 PROCEDURE — 36415 COLL VENOUS BLD VENIPUNCTURE: CPT

## 2024-03-08 PROCEDURE — 85025 COMPLETE CBC W/AUTO DIFF WBC: CPT

## 2024-03-08 PROCEDURE — 85610 PROTHROMBIN TIME: CPT

## 2024-03-08 PROCEDURE — 96376 TX/PRO/DX INJ SAME DRUG ADON: CPT

## 2024-03-08 PROCEDURE — 82435 ASSAY OF BLOOD CHLORIDE: CPT

## 2024-03-08 PROCEDURE — 99285 EMERGENCY DEPT VISIT HI MDM: CPT

## 2024-03-08 PROCEDURE — 83036 HEMOGLOBIN GLYCOSYLATED A1C: CPT

## 2024-03-08 PROCEDURE — 82962 GLUCOSE BLOOD TEST: CPT

## 2024-03-08 PROCEDURE — 82803 BLOOD GASES ANY COMBINATION: CPT

## 2024-03-08 PROCEDURE — 86901 BLOOD TYPING SEROLOGIC RH(D): CPT

## 2024-03-08 PROCEDURE — 86850 RBC ANTIBODY SCREEN: CPT

## 2024-03-08 PROCEDURE — 87186 SC STD MICRODIL/AGAR DIL: CPT

## 2024-03-08 PROCEDURE — 96375 TX/PRO/DX INJ NEW DRUG ADDON: CPT

## 2024-03-08 PROCEDURE — 82947 ASSAY GLUCOSE BLOOD QUANT: CPT

## 2024-03-08 PROCEDURE — 85014 HEMATOCRIT: CPT

## 2024-03-08 PROCEDURE — 85730 THROMBOPLASTIN TIME PARTIAL: CPT

## 2024-03-08 PROCEDURE — 86900 BLOOD TYPING SEROLOGIC ABO: CPT

## 2024-03-08 PROCEDURE — 80048 BASIC METABOLIC PNL TOTAL CA: CPT

## 2024-03-08 PROCEDURE — 84132 ASSAY OF SERUM POTASSIUM: CPT

## 2024-03-08 RX ORDER — TRAMADOL HYDROCHLORIDE 50 MG/1
1 TABLET ORAL
Qty: 6 | Refills: 0
Start: 2024-03-08 | End: 2024-03-10

## 2024-03-08 RX ORDER — ONDANSETRON 8 MG/1
1 TABLET, FILM COATED ORAL
Qty: 42 | Refills: 0
Start: 2024-03-08 | End: 2024-03-21

## 2024-03-08 RX ORDER — TAMSULOSIN HYDROCHLORIDE 0.4 MG/1
1 CAPSULE ORAL
Qty: 30 | Refills: 0
Start: 2024-03-08 | End: 2024-04-06

## 2024-03-08 RX ORDER — PANTOPRAZOLE SODIUM 20 MG/1
1 TABLET, DELAYED RELEASE ORAL
Qty: 30 | Refills: 0
Start: 2024-03-08 | End: 2024-04-06

## 2024-03-08 RX ADMIN — SACUBITRIL AND VALSARTAN 1 TABLET(S): 24; 26 TABLET, FILM COATED ORAL at 06:27

## 2024-03-08 RX ADMIN — Medication 112 MICROGRAM(S): at 06:27

## 2024-03-08 RX ADMIN — PANTOPRAZOLE SODIUM 40 MILLIGRAM(S): 20 TABLET, DELAYED RELEASE ORAL at 06:27

## 2024-03-08 RX ADMIN — Medication 25 MILLIGRAM(S): at 06:27

## 2024-03-08 NOTE — SWALLOW BEDSIDE ASSESSMENT ADULT - SWALLOW EVAL: DIAGNOSIS
Chart reviewed, attempted to see pt for bedside swallow evaluation however pt actively being discharged. Exam not appropriate. Please reconsult if change in status is noted.

## 2024-03-08 NOTE — DISCHARGE NOTE PROVIDER - NSDCMRMEDTOKEN_GEN_ALL_CORE_FT
Eliquis 5 mg oral tablet: 1 tab(s) orally 2 times a day  Entresto 24 mg-26 mg oral tablet: 1 tab(s) orally 2 times a day  meclizine 12.5 mg oral tablet: 1 tab(s) orally every 6 hours as needed for Dizziness  metoprolol succinate 25 mg oral tablet, extended release: 1 tab(s) orally once a day  ondansetron 4 mg oral tablet: 1 tab(s) orally 3 times a day as needed for  nausea  pantoprazole 40 mg oral delayed release tablet: 1 tab(s) orally once a day (before a meal)  Synthroid 112 mcg (0.112 mg) oral tablet: 1 tab(s) orally once a day  tamsulosin 0.4 mg oral capsule: 1 cap(s) orally once a day (at bedtime)  traMADol 25 mg oral tablet: 1 tab(s) orally 2 times a day as needed for  moderate pain MDD: 50 mg  Vestibular PT: 2-3 x a week  Zocor 20 mg oral tablet: 1 tab(s) orally once a day (at bedtime)   Eliquis 5 mg oral tablet: 1 tab(s) orally 2 times a day  Entresto 24 mg-26 mg oral tablet: 1 tab(s) orally 2 times a day  meclizine 12.5 mg oral tablet: 1 tab(s) orally every 6 hours as needed for Dizziness  metoprolol succinate 25 mg oral tablet, extended release: 1 tab(s) orally once a day  ondansetron 4 mg oral tablet: 1 tab(s) orally 3 times a day as needed for  nausea  pantoprazole 40 mg oral delayed release tablet: 1 tab(s) orally once a day (before a meal)  Synthroid 112 mcg (0.112 mg) oral tablet: 1 tab(s) orally once a day  tamsulosin 0.4 mg oral capsule: 1 cap(s) orally once a day (at bedtime)  traMADol 50 mg oral tablet: 1 tab(s) orally 2 times a day as needed for  moderate pain 1/2 tablet MDD: 50 mg  Vestibular PT: 2-3 x a week  Zocor 20 mg oral tablet: 1 tab(s) orally once a day (at bedtime)

## 2024-03-08 NOTE — DISCHARGE NOTE PROVIDER - CARE PROVIDER_API CALL
Marlon Sharpe  Urology  [address] [place] [id number]  Phone: [phone]  Fax: [phone]  Follow Up Time:

## 2024-03-08 NOTE — SWALLOW BEDSIDE ASSESSMENT ADULT - H & P REVIEW
89 year old female with history of Breast cancer(s/p mastectomy), Uterine cancer(hysterectomy 20 years ago), Chronic Afib on Eliquis, Hypothyroidism, CHFrEF, and vertigo presents with worsening LLQ pain attributed to kidney stone. She was in the emergency room yesterday and had improvement with pain medication and was discharged home but she states that later on at home the pain worsened prompting her to come back to the emergency room. She denies any fever or headache. Her symptoms are associated with nausea but no vomiting. She is asking for something stronger than Tylenol as she feels the pain is unbearable at this time. Pt admitted for kidney stone seen on CT, urology consulted for potential intervention./yes

## 2024-03-08 NOTE — SWALLOW BEDSIDE ASSESSMENT ADULT - COMMENTS
Uro f/u: Pain well controlled, afebrile, stable  Small 2 mm L distal ureteral stone, normal Cr  Ucx 10-49k unclear if true infection  Treat with abx and MET  Return to ER precautions reviewed.    Pt unknown to this service

## 2024-03-08 NOTE — DISCHARGE NOTE NURSING/CASE MANAGEMENT/SOCIAL WORK - NSDCFUADDAPPT_GEN_ALL_CORE_FT
APPTS ARE READY TO BE MADE: [x ] YES    Best Family or Patient Contact (if needed):    Additional Information about above appointments (if needed):    1:   2:   3:       [facility]  [address] [place] [address] [phone]

## 2024-03-08 NOTE — DISCHARGE NOTE PROVIDER - HOSPITAL COURSE
HPI:  89 year old female with history of Breast cancer(s/p mastectomy), Uterine cancer(hysterectomy 20 years ago), Chronic Afib on Eliquis, Hypothyroidism, CHFrEF, and vertigo presents with worsening LLQ pain attributed to kidney stone. She was in the emergency room yesterday and had improvement with pain medication and was discharged home but she states that later on at home the pain worsened prompting her to come back to the emergency room. She denies any fever or headache. Her symptoms are associated with nausea but no vomiting. She is asking for something stronger than Tylenol as she feels the pain is unbearable at this time.  (06 Mar 2024 18:37)    Hospital Course:  89y Female PMH of breast cancer s/p mastectomy, uterine cancer s/p hysterectomy about 20 years ago, chronic constipation, presents to the ED with worsening LLQ pain x a few weeks, found to have a 2mm L mid ureteral stone with mild to moderate hydro.  Urology consulted   Recs:  Trial of medical expulsive therapy for now  F/u outpatient for definitive stone management  DCP with med rec discussed with Dr Ni Pt cleared for DC home with 24hha     Important Medication Changes and Reason: n/a     Active or Pending Issues Requiring Follow-up:    Advanced Directives:   [ x] Full code  [ ] DNR  [ ] Hospice    Discharge Diagnoses:

## 2024-03-08 NOTE — DISCHARGE NOTE PROVIDER - NSDCFUSCHEDAPPT_GEN_ALL_CORE_FT
Elysia Rowell  [facility]  UROLOGY [address] [place] [address]  Scheduled Appointment: 03/18/2024

## 2024-03-08 NOTE — DISCHARGE NOTE NURSING/CASE MANAGEMENT/SOCIAL WORK - PATIENT PORTAL LINK FT
You can access the FollowMyHealth Patient Portal offered by [facility] by registering at the following website: [email or web address]. By joining [employer]’s FollowMyHealth portal, you will also be able to view your health information using other applications (apps) compatible with our system.

## 2024-03-08 NOTE — DISCHARGE NOTE PROVIDER - NSDCCPCAREPLAN_GEN_ALL_CORE_FT
PRINCIPAL DISCHARGE DIAGNOSIS  Diagnosis: Nephrolithiasis  Assessment and Plan of Treatment: nephrolithiasis seen by urology   Take meds as directed   Follow up with urology

## 2024-03-18 ENCOUNTER — APPOINTMENT (OUTPATIENT)
Dept: UROLOGY | Facility: CLINIC | Age: 89
End: 2024-03-18
Payer: MEDICARE

## 2024-03-18 VITALS
RESPIRATION RATE: 16 BRPM | OXYGEN SATURATION: 95 % | HEIGHT: 63 IN | WEIGHT: 130 LBS | DIASTOLIC BLOOD PRESSURE: 102 MMHG | BODY MASS INDEX: 23.04 KG/M2 | SYSTOLIC BLOOD PRESSURE: 172 MMHG | TEMPERATURE: 98 F | HEART RATE: 70 BPM

## 2024-03-18 VITALS — SYSTOLIC BLOOD PRESSURE: 161 MMHG | DIASTOLIC BLOOD PRESSURE: 89 MMHG | HEART RATE: 65 BPM

## 2024-03-18 DIAGNOSIS — Z63.4 DISAPPEARANCE AND DEATH OF FAMILY MEMBER: ICD-10-CM

## 2024-03-18 DIAGNOSIS — N20.1 CALCULUS OF URETER: ICD-10-CM

## 2024-03-18 DIAGNOSIS — N13.30 UNSPECIFIED HYDRONEPHROSIS: ICD-10-CM

## 2024-03-18 DIAGNOSIS — Z84.1 FAMILY HISTORY OF DISORDERS OF KIDNEY AND URETER: ICD-10-CM

## 2024-03-18 PROCEDURE — 99204 OFFICE O/P NEW MOD 45 MIN: CPT

## 2024-03-18 RX ORDER — SACUBITRIL AND VALSARTAN 49; 51 MG/1; MG/1
TABLET, FILM COATED ORAL
Refills: 0 | Status: ACTIVE | COMMUNITY

## 2024-03-18 RX ORDER — METOPROLOL TARTRATE 75 MG/1
TABLET, FILM COATED ORAL
Refills: 0 | Status: ACTIVE | COMMUNITY

## 2024-03-18 SDOH — SOCIAL STABILITY - SOCIAL INSECURITY: DISSAPEARANCE AND DEATH OF FAMILY MEMBER: Z63.4

## 2024-03-19 ENCOUNTER — OUTPATIENT (OUTPATIENT)
Dept: OUTPATIENT SERVICES | Facility: HOSPITAL | Age: 89
LOS: 1 days | End: 2024-03-19
Payer: MEDICARE

## 2024-03-19 ENCOUNTER — APPOINTMENT (OUTPATIENT)
Dept: RADIOLOGY | Facility: IMAGING CENTER | Age: 89
End: 2024-03-19
Payer: MEDICARE

## 2024-03-19 DIAGNOSIS — R07.9 CHEST PAIN, UNSPECIFIED: ICD-10-CM

## 2024-03-19 LAB
APPEARANCE: CLEAR
BACTERIA: NEGATIVE /HPF
BILIRUBIN URINE: NEGATIVE
BLOOD URINE: NEGATIVE
CAST: 0 /LPF
COLOR: YELLOW
EPITHELIAL CELLS: 1 /HPF
GLUCOSE QUALITATIVE U: NEGATIVE MG/DL
KETONES URINE: NEGATIVE MG/DL
LEUKOCYTE ESTERASE URINE: ABNORMAL
MICROSCOPIC-UA: NORMAL
NITRITE URINE: NEGATIVE
PH URINE: 6.5
PROTEIN URINE: NEGATIVE MG/DL
RED BLOOD CELLS URINE: 2 /HPF
SPECIFIC GRAVITY URINE: 1.02
UROBILINOGEN URINE: 0.2 MG/DL
WHITE BLOOD CELLS URINE: 6 /HPF

## 2024-03-19 PROCEDURE — 71046 X-RAY EXAM CHEST 2 VIEWS: CPT

## 2024-03-19 PROCEDURE — 71046 X-RAY EXAM CHEST 2 VIEWS: CPT | Mod: 26

## 2024-03-19 NOTE — HISTORY OF PRESENT ILLNESS
[FreeTextEntry1] : patient with first kidney stone  patient with live in aide since hosp one month before renal stone issues - for : low B12 and dizziness as per son pain left side , some gas and belching, no nausea or emesis, no fever,no voidng c/o or change in color no INC , no voiding diff , no abd pain but only in left flank and sharp and steady uses pullups wiht some vag d/c last week due to some constipation and frequent BM wiht laxativs admits to dec water and increased salt  + fh of kidney stones ( father)  in ER - urine with E coli - abx given  CT : left hydro and hydroureter to 2 mm stone  white count - 8 and creat 1.03

## 2024-03-19 NOTE — PHYSICAL EXAM
[Normal Appearance] : normal appearance [Well Groomed] : well groomed [Edema] : no peripheral edema [General Appearance - In No Acute Distress] : no acute distress [Respiration, Rhythm And Depth] : normal respiratory rhythm and effort [Exaggerated Use Of Accessory Muscles For Inspiration] : no accessory muscle use [Abdomen Soft] : soft [Costovertebral Angle Tenderness] : no ~M costovertebral angle tenderness [Abdomen Tenderness] : non-tender [] : no rash [Normal Station and Gait] : the gait and station were normal for the patient's age [Oriented To Time, Place, And Person] : oriented to person, place, and time [No Focal Deficits] : no focal deficits [Affect] : the affect was normal [No Palpable Adenopathy] : no palpable adenopathy [Mood] : the mood was normal [de-identified] : uses walker

## 2024-03-19 NOTE — ASSESSMENT
[FreeTextEntry1] : 1- check urine - hx of  E coli 2 JULIAN and bladder us for resolution  3- increase water inrake 1.5- 2l /day  4- dec salt to 2300mg /day

## 2024-03-22 LAB — BACTERIA UR CULT: ABNORMAL

## 2024-04-12 NOTE — ED ADULT NURSE NOTE - NSFALLRISK_ED_ALL_ED
Plan: Will send terbinafine 250mg 1 po QD #90, zero refills, once labs received and reviewed to patient's pharmacy. Detail Level: Zone Render In Strict Bullet Format?: No No

## 2024-04-15 ENCOUNTER — APPOINTMENT (OUTPATIENT)
Dept: CT IMAGING | Facility: IMAGING CENTER | Age: 89
End: 2024-04-15
Payer: MEDICARE

## 2024-04-15 ENCOUNTER — OUTPATIENT (OUTPATIENT)
Dept: OUTPATIENT SERVICES | Facility: HOSPITAL | Age: 89
LOS: 1 days | End: 2024-04-15
Payer: MEDICARE

## 2024-04-15 DIAGNOSIS — R42 DIZZINESS AND GIDDINESS: ICD-10-CM

## 2024-04-15 PROCEDURE — 70450 CT HEAD/BRAIN W/O DYE: CPT | Mod: MH

## 2024-04-15 PROCEDURE — 70450 CT HEAD/BRAIN W/O DYE: CPT | Mod: 26,MH

## 2024-05-04 NOTE — ED PROVIDER NOTE - PATIENT PORTAL LINK FT
Unknown if ever smoked
You can access the FollowMyHealth Patient Portal offered by [facility] by registering at the following website: [email or web address]. By joining [facility]’s FollowMyHealth portal, you will also be able to view your health information using other applications (apps) compatible with our system.

## 2024-09-11 NOTE — DISCHARGE NOTE NURSING/CASE MANAGEMENT/SOCIAL WORK - PATIENT PORTAL LINK FT
You can access the FollowMyHealth Patient Portal offered by [facility] by registering at the following website: [email or web address]. By joining [employer]’s FollowMyHealth portal, you will also be able to view your health information using other applications (apps) compatible with our system.
Statement Selected

## 2025-02-04 ENCOUNTER — APPOINTMENT (OUTPATIENT)
Dept: ULTRASOUND IMAGING | Facility: IMAGING CENTER | Age: 89
End: 2025-02-04

## 2025-02-04 ENCOUNTER — APPOINTMENT (OUTPATIENT)
Dept: MAMMOGRAPHY | Facility: IMAGING CENTER | Age: 89
End: 2025-02-04

## 2025-02-20 ENCOUNTER — APPOINTMENT (OUTPATIENT)
Dept: COLORECTAL SURGERY | Facility: CLINIC | Age: 89
End: 2025-02-20
Payer: MEDICARE

## 2025-02-20 VITALS
BODY MASS INDEX: 22.15 KG/M2 | OXYGEN SATURATION: 95 % | DIASTOLIC BLOOD PRESSURE: 76 MMHG | TEMPERATURE: 97.7 F | WEIGHT: 125 LBS | HEART RATE: 52 BPM | HEIGHT: 63 IN | SYSTOLIC BLOOD PRESSURE: 148 MMHG | RESPIRATION RATE: 16 BRPM

## 2025-02-20 DIAGNOSIS — K59.00 CONSTIPATION, UNSPECIFIED: ICD-10-CM

## 2025-02-20 DIAGNOSIS — K62.89 OTHER SPECIFIED DISEASES OF ANUS AND RECTUM: ICD-10-CM

## 2025-02-20 PROCEDURE — 99203 OFFICE O/P NEW LOW 30 MIN: CPT | Mod: 25

## 2025-02-20 PROCEDURE — 46600 DIAGNOSTIC ANOSCOPY SPX: CPT

## 2025-02-20 RX ORDER — TRAMADOL HYDROCHLORIDE 75 MG/1
TABLET, COATED ORAL
Refills: 0 | Status: ACTIVE | COMMUNITY

## 2025-02-20 RX ORDER — VALSARTAN 160 MG/1
160 TABLET, COATED ORAL
Refills: 0 | Status: ACTIVE | COMMUNITY

## 2025-02-20 RX ORDER — HYDROCORTISONE 25 MG/G
2.5 CREAM TOPICAL
Qty: 1 | Refills: 5 | Status: ACTIVE | COMMUNITY
Start: 2025-02-20 | End: 1900-01-01

## 2025-02-20 RX ORDER — RIVAROXABAN 20 MG/1
20 TABLET, FILM COATED ORAL
Refills: 0 | Status: ACTIVE | COMMUNITY

## 2025-04-28 NOTE — ED ADULT NURSE NOTE - SUICIDE SCREENING QUESTION 1
Pharmacy faxed in a request for prior authorization on:    Medication: Solifenacin  Dosage: 5 mg tab   Quantity requested:  90  Pharmacy for prescription has been selected.    Initiation of prior authorization needed.    ID # [id number]     No

## 2025-06-30 NOTE — PHYSICAL THERAPY INITIAL EVALUATION ADULT - SITTING BALANCE: DYNAMIC
Regarding: medications-procedure questions  ----- Message from Antonella YO sent at 6/30/2025  8:58 AM EDT -----  Patient called in stating she received a call from clinical staff regarding two medications patient is supposed to take prior to procedure. Patient is aware she is having an EGD only and is requesting a call back from clinical staff to go over meds.     [facility]